# Patient Record
Sex: FEMALE | Race: WHITE | NOT HISPANIC OR LATINO | URBAN - METROPOLITAN AREA
[De-identification: names, ages, dates, MRNs, and addresses within clinical notes are randomized per-mention and may not be internally consistent; named-entity substitution may affect disease eponyms.]

---

## 2017-08-22 ENCOUNTER — IMPORTED ENCOUNTER (OUTPATIENT)
Dept: URBAN - METROPOLITAN AREA CLINIC 59 | Facility: CLINIC | Age: 67
End: 2017-08-22

## 2017-08-22 PROBLEM — H04.121 TEAR FILM INSUFFICIENCY OF RT LACRIMAL GLAND: Noted: 2017-08-22

## 2017-08-22 PROBLEM — H25.13 BILATERAL NUCLEAR SCLEROSIS CATARACTS: Noted: 2017-08-22

## 2017-08-22 PROBLEM — H04.122 TEAR FILM INSUFFICIENCY OF LT LACRIMAL GLAND: Noted: 2017-08-22

## 2017-08-22 PROBLEM — E11.9 TYPE II DM W/O COMPLICATIONS: Noted: 2017-08-22

## 2017-08-22 PROCEDURE — 83861 MICROFLUID ANALY TEARS: CPT

## 2017-08-22 PROCEDURE — 92014 COMPRE OPH EXAM EST PT 1/>: CPT

## 2018-08-21 ENCOUNTER — IMPORTED ENCOUNTER (OUTPATIENT)
Dept: URBAN - METROPOLITAN AREA CLINIC 59 | Facility: CLINIC | Age: 68
End: 2018-08-21

## 2018-08-21 PROBLEM — E11.9 TYPE II DM W/O COMPLICATIONS: Noted: 2018-08-21

## 2018-08-21 PROBLEM — H04.122 TEAR FILM INSUFFICIENCY OF LT LACRIMAL GLAND: Noted: 2018-08-21

## 2018-08-21 PROBLEM — H25.13 BILATERAL NUCLEAR SCLEROSIS CATARACTS: Noted: 2018-08-21

## 2018-08-21 PROBLEM — H04.121 TEAR FILM INSUFFICIENCY OF RT LACRIMAL GLAND: Noted: 2018-08-21

## 2018-08-21 PROCEDURE — 92014 COMPRE OPH EXAM EST PT 1/>: CPT

## 2018-08-21 PROCEDURE — 92015 DETERMINE REFRACTIVE STATE: CPT

## 2019-07-17 ENCOUNTER — TELEPHONE (OUTPATIENT)
Dept: INTEGRATIVE MEDICINE | Age: 69
End: 2019-07-17

## 2019-07-17 RX ORDER — GLUCOSAM/CHONDRO/HERB 149/HYAL 750-100 MG
TABLET ORAL DAILY
COMMUNITY

## 2019-07-17 RX ORDER — ACETAMINOPHEN 500 MG
5000 TABLET ORAL DAILY
COMMUNITY

## 2019-07-17 RX ORDER — ASPIRIN 81 MG/1
81 TABLET ORAL DAILY
COMMUNITY

## 2019-07-25 ENCOUNTER — OFFICE VISIT (OUTPATIENT)
Dept: INTEGRATIVE MEDICINE | Age: 69
End: 2019-07-25

## 2019-07-25 VITALS
HEART RATE: 70 BPM | BODY MASS INDEX: 39.24 KG/M2 | DIASTOLIC BLOOD PRESSURE: 82 MMHG | SYSTOLIC BLOOD PRESSURE: 134 MMHG | RESPIRATION RATE: 20 BRPM | TEMPERATURE: 98.1 F | OXYGEN SATURATION: 97 % | WEIGHT: 250 LBS | HEIGHT: 67 IN

## 2019-07-25 DIAGNOSIS — E06.3 HYPOTHYROIDISM DUE TO HASHIMOTO'S THYROIDITIS: ICD-10-CM

## 2019-07-25 DIAGNOSIS — N95.1 MENOPAUSE SYNDROME: Primary | ICD-10-CM

## 2019-07-25 DIAGNOSIS — G47.00 INSOMNIA DISORDER RELATED TO KNOWN ORGANIC FACTOR: ICD-10-CM

## 2019-07-25 PROBLEM — D35.01 ADRENAL ADENOMA, RIGHT: Status: ACTIVE | Noted: 2019-07-25

## 2019-07-25 PROBLEM — E66.9 OBESITY (BMI 30-39.9): Status: ACTIVE | Noted: 2019-07-25

## 2019-07-25 PROBLEM — L65.9 ALOPECIA: Status: ACTIVE | Noted: 2019-07-25

## 2019-07-25 PROCEDURE — INTG102 PR NEW WELLNESS ASSESSMENT (60 MIN): Performed by: FAMILY MEDICINE

## 2019-07-25 RX ORDER — LIOTHYRONINE SODIUM 25 UG/1
TABLET ORAL
Qty: 90 TABLET | Refills: 3 | Status: SHIPPED | OUTPATIENT
Start: 2019-07-25 | End: 2020-04-27 | Stop reason: SDUPTHER

## 2019-07-25 RX ORDER — LEVOTHYROXINE SODIUM 137 UG/1
TABLET ORAL
Qty: 90 TABLET | Refills: 3 | Status: SHIPPED | OUTPATIENT
Start: 2019-07-25 | End: 2020-07-16

## 2019-07-25 ASSESSMENT — ENCOUNTER SYMPTOMS
HEMATURIA: 0
PALPITATIONS: 0
COLOR CHANGE: 0
DIARRHEA: 0
ARTHRALGIAS: 0
VOMITING: 0
FATIGUE: 0
FEVER: 0
UNEXPECTED WEIGHT CHANGE: 0
CHEST TIGHTNESS: 0
APNEA: 0
SINUS PRESSURE: 0
SHORTNESS OF BREATH: 0
CONFUSION: 0
SLEEP DISTURBANCE: 0
AGITATION: 0
CONSTIPATION: 0
ACTIVITY CHANGE: 0
DIZZINESS: 0
SORE THROAT: 0
JOINT SWELLING: 0
HALLUCINATIONS: 0
FREQUENCY: 1
BLOOD IN STOOL: 0
APPETITE CHANGE: 0
ADENOPATHY: 0

## 2019-07-25 NOTE — PROGRESS NOTES
"[facility] Follow Up Visit    Date: 2019    Name: Miesha Ryan    : 1950    Reason for visit:     Allergies: Sulfa (sulfonamide antibiotics)    Medications:   Current Outpatient Prescriptions   Medication Sig Dispense Refill   • aspirin 81 mg enteric coated tablet Take 81 mg by mouth daily.     • cholecalciferol, vitamin D3, (VITAMIN D3) 2,000 unit capsule Take 2,000 Units by mouth daily.     • CHOLESTEROL ORAL Take by mouth. Cholest by renay.     • multivit,Ca,iron,min/FA/dwn868 (DAILY ENERGY ORAL) Take by mouth. Energy revitalization. 1 scoop daily     • omega 3-dha-epa-fish oil (FISH OIL) 1,000 mg (120 mg-180 mg) capsule Take by mouth 2 (two) times a day.     • prasterone, DHEA, (DHEA ORAL) Take 10 mg by mouth daily.     • THYROID, BULK, MISC 2 grain po q am, 1 grain po q pm     • turmeric (CURCUMIN MISC) 500 mg 2 (two) times a day.       No current facility-administered medications for this visit.        Supplements: [ ]    Family History:   Family History   Problem Relation Age of Onset   • Cancer Mother    • Cancer Father          Social History:   Social History     Social History   • Marital status:      Spouse name: N/A   • Number of children: N/A   • Years of education: N/A     Social History Main Topics   • Smoking status: Former Smoker   • Smokeless tobacco: Never Used   • Alcohol use 4.2 oz/week     7 Standard drinks or equivalent per week   • Drug use: No   • Sexual activity: Not Asked     Other Topics Concern   • None     Social History Narrative   • None        Vitals: /82 (BP Location: Left forearm, Patient Position: Sitting)   Temp 36.7 °C (98.1 °F) (Oral)   Ht 1.702 m (5' 7\")   Wt 113 kg (250 lb)   BMI 39.16 kg/m²     Weight: Weight: 113 kg (250 lb)     Height: Height: 170.2 cm (5' 7\")     BMI:Body mass index is 39.16 kg/m².    BALTA: [ ]    % Body Fat: [ ]    % Muscle Mass: [ ]    Visceral fat:  []    WC: [ ]Working         HPI  " 3 weeks in the [place] 22 family,  2 months all over [place] still with a bunch of people.    Wt up 5 lbs  Jan and Feb was doing her supplements and smoothies, and not much in the evening.  By end of evening, Taxes kicked in , hours longer, stresss went, and started eaing only a little different.  Not as much smoothies.  Slim cuisine microwave.   Overall   Yawning and tied in 3 pm.  Cleaned the whole house after tax season, spring cleaning.  Pressure washed it all.  Week before Mem Day began walking 2 miles in the morning..   Not last 10 days.    Diet been buying cucumbers and squash.  Home made fruit salad.      Not losing.     Problems getting naturthroid.  Not enough for her trip.    2003 began with Dr Garcia started on Synthroid and lipitor.  Then Hilda leonard on synthroid, went natural to another endo doc.  And switched to the naturethroid.   100 lbs heavier.     Bad hot flashes, sweats at night some  Sleep is poor.  Not up  Bladder surgery  Still goes every 90 minutes.  Gets up at night to pee. Ure/ Had the catheter after the sling surgery, kkept going often after the catheter removed.  Dec 10 sling procedru      Goes every day Nl BM, only GERD if eats too late.   1992  42 yr old had LYDIA.  Around 48 hot flashes, and they more or less persisted since then .    Was on the extrogen patch due o hot flashes and not sleeping at night.  Adrenal cyst.  Put on a patch 20 years ago.  Got  in Aug of 2005.  When on the patch felt much better and slept maybe 4 - 6 hours a night.   Hot flashes went away.    Dr Shane says making enough on its own.  Wholistic doc .  Adrenal support and lost weight and lost 30 lbs over 9 - 10 months and slept.   DHEA every other day.    CT right adrenal adenoma says did not change over several years of CT scans.      MRI done for a few years, probably at [place].      Back from the cruise on the 25th of Aug, Dentist on 26th of August, up this way Sept the 3rd.   No acupuncture for 2  years.  Nieves the acupuncturist saw in the past.       Review of Systems   Constitutional: Negative for activity change, appetite change, fatigue, fever and unexpected weight change.   HENT: Negative for sinus pressure and sore throat.    Eyes:        Wears glasses   Respiratory: Negative for apnea, chest tightness and shortness of breath.    Cardiovascular: Negative for chest pain, palpitations and leg swelling.   Gastrointestinal: Negative for blood in stool, constipation, diarrhea and vomiting.   Genitourinary: Positive for frequency and urgency. Negative for hematuria.        Had urethral sling placed in Dec 2018   Musculoskeletal: Negative for arthralgias and joint swelling.   Skin: Negative for color change, pallor and rash.   Neurological: Negative for dizziness and syncope.   Hematological: Negative for adenopathy.   Psychiatric/Behavioral: Negative for agitation, confusion, hallucinations and sleep disturbance.         Physical Exam   Alert, NAD, Nl gait.  Respiratory effort normal, RR about 16/min.  No edema or rash.       Other:    Labs: Glucose was 138, HEMA globin A1c 6.3%, insulin was 24, down from 35.  Rest of CMP normal, DHEA was 108, testosterone was normal, estradiol level was about 12.5.        ASSESSMENT:  Postmenopausal since about age 48, total abdominal hysterectomy for fibroids in 1992.  Vasomotor symptoms are distressing, waking her up at night.  She was briefly on estradiol by patch may be 15 years ago, but only took it for a year or less.    Alopecia, diffuse hair thinning for the last few years.  Stress-induced, excess cortisol, menopausal effect, inadequate thyroid?    Obesity, improved her variety of foods and decreased sugar and simple carbohydrates without weight loss.  Rule out several factors affecting persistent weight gain, including poor sleep, stress, possible micro-biome problems, imbalance of her hormone systems, rule out toxin such as mold toxin issues.    Prediabetes with  insulin resistance    Pre-hypertension, blood pressure good today.    Hashimoto's thyroiditis currently on Nature-Throid, mild elevation of TPO antibodies in the 116 range.  Has a past history of a thyroid nodule.    Negative testing for hepatitis C    Urine incontinence, mixed, only mild improvement after urethral sling.  UTI in November 2018.        Getting annual breast cancer screening through thermography.    Hyperlipidemia on Mariel Choleast.     PLAN:  We discussed the general work-up for difficulty with losing weight, we also discussed several dietary options, including seeing a nutritionist.  Patient will most likely benefit from a ketogenic diet approach, and she will consider this for after her vacation.    Continue coenzyme Q 10 100 mg a day due to the red yeast rice, vitamin D 2000 units a day, fish oil 1000 mg a day, DHEA may be 1 a day now at 10 mg for the next 3 months.  See what happens with weight, hair, hot flashes, and levels.  Continue curcumin twice daily    Begin ONE multivitamin by pure encapsulation's 1 a day and discontinue the energy revitalization system powder for the next 3 months while she is away.    We discussed several methods, medications, and techniques to try and help with her sleep.  She will do a trial of the end fatigue sleep formula from [employer] [employer], number 60 pills given today.  To take 2 at night for the next month.  Discussed use, side effects, and expected action over the next 2 to 4 weeks.    Due to daytime fatigue and stress, may use Rhodiola 1 with breakfast and one with lunch as an adaptogen.    Encouraged her to return to regular walking program which I think was improving her insulin resistance and energy.    We discussed different forms of thyroid, and the fact that she was given Nature-Throid just in an attempt to have more natural therapy but not because she failed other therapy.  Discussed alternatives other medications, as well as using T4 and  T3 separately for more accurate fine-tuning of hormone need.  Patient is in favor of making a switch at this point.  To begin levothyroxine 137 mcg 1 a day, before breakfast.  Prescription called in.  Also to take liothyronine T3, 25 mcg, one half before breakfast, prescription also called in.  Lab slip given to have TSH, free T4, free T3 done in about 5 to 6 weeks when she is in between her [place] cruise and her [place] vacation.  Lipid panel also ordered.    We discussed other foods, fiber, cinnamon that have been associated with better sugar control.  Consider metformin.    She will contact an acupuncturist, Lillian Juarez in [place], about acupuncture treatment for her hot flashes.  We also discussed possibility of low-dose transdermal estrogen, estradiol, because of the severity of her symptoms.    Plan is to return also in 3 months after all her vacation, to assess where her weight, diet, and symptoms are at.  Consider overhaul of supplements, full investigation of her GI tract, and environmental exposures at that time.  Patient also has not had a sleep study done, but denies snoring.    Considered four-point saliva cortisol testing, consider repeat MRI of the adrenals, last done about 5 years ago.    RTO:   [ ] 3 months, call in 5 weeks

## 2019-08-12 ENCOUNTER — TELEPHONE (OUTPATIENT)
Dept: INTEGRATIVE MEDICINE | Age: 69
End: 2019-08-12

## 2019-08-12 NOTE — TELEPHONE ENCOUNTER
Call:  During out office visit We specifically decided to stop the combination thyroid product she had been on, and prescribe the T4 hormone (L-thyroxine) and the T3 hormone (lipothyronine) separately in order to fine tune her levels and ensure accurate dosing.

## 2019-08-12 NOTE — TELEPHONE ENCOUNTER
Pt called, very upset that she rec'd L-Thyroxine from the pharmacy, She says that the L-thyroxine does not work for her. Liothyronine was also sent in.. What to do?

## 2019-08-27 ENCOUNTER — IMPORTED ENCOUNTER (OUTPATIENT)
Dept: URBAN - METROPOLITAN AREA CLINIC 59 | Facility: CLINIC | Age: 69
End: 2019-08-27

## 2019-08-27 PROBLEM — H25.13 BILATERAL NUCLEAR SCLEROSIS CATARACTS: Noted: 2019-08-27

## 2019-08-27 PROBLEM — H04.123 TEAR FILM INSUFFICIENCY OF BILATERAL LACRIMAL GLANDS: Noted: 2019-08-27

## 2019-08-27 PROBLEM — E11.9 TYPE II DM W/O COMPLICATIONS: Noted: 2019-08-27

## 2019-08-27 PROCEDURE — 92014 COMPRE OPH EXAM EST PT 1/>: CPT

## 2019-08-30 LAB
CHOLEST SERPL-MCNC: 176 MG/DL (ref 100–199)
HDLC SERPL-MCNC: 42 MG/DL
LDLC SERPL CALC-MCNC: 95 MG/DL (ref 0–99)
LDLC/HDLC SERPL: 2.3 RATIO (ref 0–3.2)
T3FREE SERPL-MCNC: 3.4 PG/ML (ref 2–4.4)
T4 FREE SERPL-MCNC: 1.44 NG/DL (ref 0.82–1.77)
TRIGL SERPL-MCNC: 195 MG/DL (ref 0–149)
TSH SERPL DL<=0.005 MIU/L-ACNC: 0.02 UIU/ML (ref 0.45–4.5)
VLDLC SERPL CALC-MCNC: 39 MG/DL (ref 5–40)

## 2019-09-03 ENCOUNTER — TELEPHONE (OUTPATIENT)
Dept: INTEGRATIVE MEDICINE | Age: 69
End: 2019-09-03

## 2019-09-03 NOTE — TELEPHONE ENCOUNTER
TC pt says still has fatigue on the current new thyroid dose of 12.5 of T3 in the morning and 137 of llevothyroxine.      TSH is suppressed at 0.016, T4 and Ft3 are improved in middle range.      Suggested that more is in play then just the thyroid about her fatigue.  Going away for two months now, would not change dose.  RTO after 2 mo for overhaul.    Dr. RON

## 2019-11-27 NOTE — PROGRESS NOTES
[facility] Follow Up Visit    Date: 12/3/2019    Name: Miesha yRan    : 1950    Reason for visit:     Allergies: Sulfa (sulfonamide antibiotics)    Medications:   Current Outpatient Medications   Medication Sig Dispense Refill   • aspirin 81 mg enteric coated tablet Take 81 mg by mouth daily.     • cholecalciferol, vitamin D3, (VITAMIN D3) 2,000 unit capsule Take 2,000 Units by mouth daily.     • CHOLESTEROL ORAL Take by mouth. Cholest by renay.     • HERBAL DRUGS ORAL Take by mouth. cinnamon     • levothyroxine (SYNTHROID) 137 mcg tablet One pill each morning before breakfast 90 tablet 3   • liothyronine (CYTOMEL) 25 mcg tablet Take 1/2 pill each morning before breakfast 90 tablet 3   • multivit,Ca,iron,min/FA/egv600 (DAILY ENERGY ORAL) Take by mouth. Energy revitalization. 1 scoop daily     • omega 3-dha-epa-fish oil (FISH OIL) 1,000 mg (120 mg-180 mg) capsule Take by mouth 2 (two) times a day.     • prasterone, DHEA, (DHEA ORAL) Take 10 mg by mouth daily.     • turmeric (CURCUMIN MISC) 500 mg 2 (two) times a day.       No current facility-administered medications for this visit.        Supplements: [ ]    Family History:   Family History   Problem Relation Age of Onset   • Cancer Biological Mother    • Cancer Biological Father          Social History:   Social History     Socioeconomic History   • Marital status:      Spouse name: None   • Number of children: None   • Years of education: None   • Highest education level: None   Occupational History   • None   Social Needs   • Financial resource strain: None   • Food insecurity:     Worry: None     Inability: None   • Transportation needs:     Medical: None     Non-medical: None   Tobacco Use   • Smoking status: Former Smoker   • Smokeless tobacco: Never Used   Substance and Sexual Activity   • Alcohol use: Yes     Alcohol/week: 7.0 standard drinks     Types: 7 Standard drinks or equivalent per week   • Drug  "use: No   • Sexual activity: None   Lifestyle   • Physical activity:     Days per week: None     Minutes per session: None   • Stress: None   Relationships   • Social connections:     Talks on phone: None     Gets together: None     Attends Oriental orthodox service: None     Active member of club or organization: None     Attends meetings of clubs or organizations: None     Relationship status: None   • Intimate partner violence:     Fear of current or ex partner: None     Emotionally abused: None     Physically abused: None     Forced sexual activity: None   Other Topics Concern   • None   Social History Narrative   • None        Vitals:   Visit Vitals  /72 (BP Location: Left upper arm, Patient Position: Sitting)   Pulse 84   Temp 37.1 °C (98.7 °F) (Oral)   Ht 1.702 m (5' 7\")   Wt 116 kg (255 lb 6.4 oz)   SpO2 95%   BMI 40.00 kg/m²       Weight: Weight: 116 kg (255 lb 6.4 oz)     Height: Height: 170.2 cm (5' 7\")     BMI:Body mass index is 40 kg/m².    BALTA: [ ]    % Body Fat: [ ]    % Muscle Mass: [ ]    Visceral fat:  []    WC: [ ]    NSR: 81/min      HPI  [place] RON in [place].   Stockings compression      10 weeks in [place]  Barefoot at home.   Sandals or loafers and  Some burning and tingling in the feet for 40 years  Leather shoes needed.     Toothache like HA last week, lips a little numb, and feels different.  Left side around the outside of the lips. Tingling feeling.  For about the last week.   Dentist tomorrow. No pain .  Clenches jaw some today.  Mouth gets dry at night.  Started the humidifier in the bedroom.  Nose congestion is improved.   Mild ache in the left side around the temple.   No vertigo,   Tinnitus for many years.   No numbness in the arms or legs  Right arm may be a little sore, hurts around the outside of the right elbow.  Ran out of the curcumin and     Right elbow banged on a door frame then worked 19 days in a row.    Hot flashes and sweats at night.   Sleep is still " poor.  Tried the sleep formla in the summer, has to get up to pee every 2 hours.  Sling procedure was done last Dec.  2018 .  Told she needed to go every 2 hours a year later.     Rhodiola only one bottle.  Out since [place].   DHEA ordered also    Energy revitalization powder,   ONE and Vit D on days not using it.     Bowels moving most days.      Not following any particular diet while traveling a lot.     Bread eaten.  No big trip planned.   Dinner at [facility] loves to cook, but not doing it for one person.     Cereal or scrambled eggs from [employer] and frozen dinner.     Legs got tired after walking too much on her trips.   Back pain.      Disc her fatigue and thyroid in Sept 3  By three in the afternoon was getting really tired iN Nov. Then wide awake when got home later.     Not returned to acupuncture yet.    Up to 10 pm at night.  Up around 5 - 530 am.  Sunday morning was asleep at 830, got up at 630am and fell back to sleep.      Mild GLASS with lifting suitcases.    Planning on using the stationary reclining bike.    Work until end of year Monday only for 3 weeks.     Tosses ad turns frequently, cannot lay on back or gets tingling in the feet. ,   Bronchitis easy when gets sick, had pneumonia in the past.   Has taken astragalus for the winter to prevent infection in the past.     Review of Systems   Constitutional: Negative for activity change, appetite change, fatigue, fever and unexpected weight change.   HENT: Positive for dental problem (To dentist for routine in 1 day).    Eyes:        Wears glasses   Respiratory: Negative for chest tightness and shortness of breath.    Cardiovascular: Negative for chest pain, palpitations and leg swelling.   Gastrointestinal: Negative for blood in stool and vomiting.   Endocrine: Positive for heat intolerance.   Genitourinary: Positive for frequency and urgency. Negative for hematuria.        Hot flashes and night sweats, menopause was at age 48, never stopped  having sx.  Did take HRT briefly in the past.    Musculoskeletal: Negative for arthralgias and joint swelling.   Skin: Negative for color change, pallor and rash.   Neurological: Positive for numbness (tingling of the left side of the mouth at times.  ). Negative for dizziness and syncope.   Hematological: Negative for adenopathy.   Psychiatric/Behavioral: Negative for agitation, confusion, decreased concentration, hallucinations and sleep disturbance.           Physical Exam   Constitutional: She is oriented to person, place, and time. She appears well-developed and well-nourished. No distress.   HENT:   Head: Normocephalic.   Mouth/Throat: Oropharynx is clear and moist. No oropharyngeal exudate.   Mild diffuse hair thinning.    Eyes: Conjunctivae and EOM are normal. No scleral icterus.   Neck: Normal range of motion. No thyromegaly present.   Cardiovascular: Normal rate, regular rhythm, normal heart sounds and intact distal pulses.   Pulmonary/Chest: Effort normal and breath sounds normal.   Abdominal: Soft. Bowel sounds are normal.   Musculoskeletal: She exhibits no edema.   Lymphadenopathy:     She has no cervical adenopathy.   Neurological: She is alert and oriented to person, place, and time. No cranial nerve deficit.   Skin: Skin is warm and dry. No rash noted. No pallor.   Psychiatric: She has a normal mood and affect. Her behavior is normal.   Nursing note and vitals reviewed.        Other:    Labs:     MSQ Score: [ ]      ASSESSMENT:  Postmenopausal since about age 48, total abdominal hysterectomy for fibroids in 1992.  Vasomotor symptoms are distressing, waking her up at night.  She was briefly on estradiol by patch may be 15 years ago, but only took it for a year or less.     Alopecia, diffuse hair thinning for the last few years.  Stress-induced, excess cortisol, menopausal effect, inadequate thyroid?     Obesity, improved her variety of foods and decreased sugar and simple carbohydrates without weight  loss.  Rule out several factors affecting persistent weight gain, including poor sleep, stress, possible micro-biome problems, imbalance of her hormone systems, rule out toxin such as mold toxin issues. May require additional workup.      Prediabetes with insulin resistance, Insulin level 34 and a1c 6.2% in July 2019.     Pre-hypertension, blood pressure good today.     Hashimoto's thyroiditis currently on 12.5 ugm T3 and 137 ugm of T4, TSH suppressed but hormone levels are in good range.  mild elevation of TPO antibodies in the 116 range.  Has a past history of a thyroid nodule.     Negative testing for hepatitis C     Urine incontinence, mixed, only mild improvement after urethral sling.  UTI in November 2018.          Getting annual breast cancer screening through thermography.     Hyperlipidemia on Mariel Choleast.  LDL 96 in August.     Insomnia, awakening from hot flashes, awakening from urinary frequency, possible sleep apnea or musculoskeletal symptoms due to obesity.  May have been helped by the herbal sleep formula we gave her in July.    Edema when traveling, most likely due to weight and venous insufficiency.  Good arterial blood flow present.    Right lateral elbow epicondylitis from trauma and overuse    PLAN:  1. [ ] She should try to ice the right elbow for 10 to 15 minutes twice a day for the next 1 to 2 weeks.  Okay to massage the extensor bundle, stretching exercises discussed.  2. If she needs a note about requiring a gym membership to improve her health she will give us a call so we can order her services to improve her diabetes and heart risk.  3. Patient feels would be impossible to get a good sleep study in her, although she seems to be at high risk for sleep apnea and periodic limb movement disorder.  4. Patient is wishing to try something for hot flashes other than prescription medication or horonme replacement.  She may take estravera by [place] which is Siberian rhubarb, to take 1  a day for the next 3 months.  5. Discussed prevention of leg swelling during travel and during times of prolonged sitting or standing.  Patient is ordering knee-high thigh-high stockings 20 to 30mmHg pressure to wear during those times.  Also can consider taking Pycnogenol 50 mg 1 twice a day for 3 monthsl   6. Continue her current thyroid dose, recheck blood tests in 3 months  7. May continue her Mariel Choleast, recheck CMP in 3 months.  Also check ferritin and CBC to see if contributing to periodic limb movements.  8. Check cortisol fasting in 3 months.  Consider MRI of the adrenals since she may have had some adrenal adenoma noted in the past.  9. We spent a lot of time talking about the lifestyle changes she needs to make in order to lose weight which will help all of her problems.  She is looking at a gym membership which would be paid for by her health insurance, as well as using her stationary bicycle at home on a regular basis.  10. We discussed adequate prep work, shopping, and meal planning when she has to cook for herself for the week.  Given the cardio metabolic diet guide to help with some ideas to include protein at most meals, and markedly cut down on carbohydrate intake.  11. Check insulin and hemoglobin A1c in 3 months as well.  Consider metformin  12. For her fatigue, she will restart the Rhodiola rosacea, 1 pill with breakfast and lunch for the next 3 months.  Also restart the DHEA 10 mg only once a day for 3 months.  13. Continue to alternate between the ONE MVI with 2000 u of Vit D extra and the energy revitalization system vitamin powder  14. Patient is considering acupuncture for her muscle aches and for stress relief.      RTO:   [ ] 3 months, BALTA

## 2019-12-03 ENCOUNTER — OFFICE VISIT (OUTPATIENT)
Dept: INTEGRATIVE MEDICINE | Age: 69
End: 2019-12-03

## 2019-12-03 VITALS
HEART RATE: 84 BPM | WEIGHT: 255.4 LBS | HEIGHT: 67 IN | OXYGEN SATURATION: 95 % | TEMPERATURE: 98.7 F | BODY MASS INDEX: 40.09 KG/M2 | SYSTOLIC BLOOD PRESSURE: 132 MMHG | DIASTOLIC BLOOD PRESSURE: 72 MMHG

## 2019-12-03 DIAGNOSIS — N95.1 MENOPAUSE SYNDROME: Primary | ICD-10-CM

## 2019-12-03 DIAGNOSIS — G47.00 INSOMNIA DISORDER RELATED TO KNOWN ORGANIC FACTOR: ICD-10-CM

## 2019-12-03 DIAGNOSIS — E06.3 HYPOTHYROIDISM DUE TO HASHIMOTO'S THYROIDITIS: ICD-10-CM

## 2019-12-03 DIAGNOSIS — L65.9 ALOPECIA: ICD-10-CM

## 2019-12-03 DIAGNOSIS — E66.9 OBESITY (BMI 30-39.9): ICD-10-CM

## 2019-12-03 PROCEDURE — INTG103 PR FOLLOW-UP CONSULT (60 MIN): Performed by: FAMILY MEDICINE

## 2019-12-03 ASSESSMENT — ENCOUNTER SYMPTOMS
UNEXPECTED WEIGHT CHANGE: 0
CHEST TIGHTNESS: 0
DIZZINESS: 0
ARTHRALGIAS: 0
PALPITATIONS: 0
DECREASED CONCENTRATION: 0
FREQUENCY: 1
SHORTNESS OF BREATH: 0
FATIGUE: 0
NUMBNESS: 1
SLEEP DISTURBANCE: 0
HALLUCINATIONS: 0
CONFUSION: 0
HEMATURIA: 0
ACTIVITY CHANGE: 0
VOMITING: 0
JOINT SWELLING: 0
COLOR CHANGE: 0
AGITATION: 0
APPETITE CHANGE: 0
BLOOD IN STOOL: 0
FEVER: 0
ADENOPATHY: 0

## 2020-04-20 NOTE — PROGRESS NOTES
[facility] Follow Up Visit    Date: 2020    Name: Miesha Ryan    : 1950    Reason for visit:     Allergies: Sulfa (sulfonamide antibiotics)    Medications:   Current Outpatient Medications   Medication Sig Dispense Refill   • aspirin 81 mg enteric coated tablet Take 81 mg by mouth daily.     • cholecalciferol, vitamin D3, (VITAMIN D3) 2,000 unit capsule Take 2,000 Units by mouth daily.     • CHOLESTEROL ORAL Take by mouth. Cholest by renay.     • HERBAL DRUGS ORAL Take by mouth. cinnamon     • levothyroxine (SYNTHROID) 137 mcg tablet One pill each morning before breakfast 90 tablet 3   • liothyronine (CYTOMEL) 25 mcg tablet Take 1/2 pill each morning before breakfast 90 tablet 3   • multivit,Ca,iron,min/FA/kyu045 (DAILY ENERGY ORAL) Take by mouth. Energy revitalization. 1 scoop daily     • omega 3-dha-epa-fish oil (FISH OIL) 1,000 mg (120 mg-180 mg) capsule Take by mouth 2 (two) times a day.     • prasterone, DHEA, (DHEA ORAL) Take 10 mg by mouth daily.     • turmeric (CURCUMIN MISC) 500 mg 2 (two) times a day.       No current facility-administered medications for this visit.        Supplements: [ ]    Family History:   Family History   Problem Relation Age of Onset   • Cancer Biological Mother    • Cancer Biological Father          Social History:   Social History     Socioeconomic History   • Marital status:      Spouse name: Not on file   • Number of children: Not on file   • Years of education: Not on file   • Highest education level: Not on file   Occupational History   • Not on file   Social Needs   • Financial resource strain: Not on file   • Food insecurity:     Worry: Not on file     Inability: Not on file   • Transportation needs:     Medical: Not on file     Non-medical: Not on file   Tobacco Use   • Smoking status: Former Smoker   • Smokeless tobacco: Never Used   Substance and Sexual Activity   • Alcohol use: Yes     Alcohol/week: 7.0  standard drinks     Types: 7 Standard drinks or equivalent per week   • Drug use: No   • Sexual activity: Not on file   Lifestyle   • Physical activity:     Days per week: Not on file     Minutes per session: Not on file   • Stress: Not on file   Relationships   • Social connections:     Talks on phone: Not on file     Gets together: Not on file     Attends Synagogue service: Not on file     Active member of club or organization: Not on file     Attends meetings of clubs or organizations: Not on file     Relationship status: Not on file   • Intimate partner violence:     Fear of current or ex partner: Not on file     Emotionally abused: Not on file     Physically abused: Not on file     Forced sexual activity: Not on file   Other Topics Concern   • Not on file   Social History Narrative   • Not on file        Vitals: There were no vitals taken for this visit.    Weight:       Height:       BMI:There is no height or weight on file to calculate BMI.    BALTA: [ ]    % Body Fat: [ ]    % Muscle Mass: [ ]    Visceral fat:  []    WC: [ ]    NSR:       HPI   Telephone FU visit due to the Corona virus.       Last office visit 12/3/19  Weight was 255     [facility]    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  Little at night.  Did not lose an ounce, did not feel any better.  End of Feb until a week ago doing the taxes.  Weight went back up.      For the last month trying to find any produce is a problem.       Not feeling any better.  Last two months let a 615 am, and not back until 7 - 8 pm for doing taxes.   At 3 pm in afternoon felt sleepy.  Hot flashes are bad again, hot and sweating at night.  Sweats    in 2005 was over 100 lbs.  Did not know Hashimoto's is an autoimmune disease.       Dyspnea with exertion.       Sleep only 2 hours at a time, then gets up to pee.   Sling procedure not improved 12/2019 after  done in 2018.  Still has to go every 2 hours.  Not leaking though. Did not get the Estrovera.    DHEA 10 mg every other day  Rhodiola BID ran out of that last month.    Astragalus taken regular 2 a day.    Mariel Choleast      Take one pill to eliminate DM II  Mouth dry. Face tingling is better.   Tinnitus, no vertigo.      No face to face appt.    Leg swelling is not bad.  A little bit.    No covid sx.  No cat or dog.       Taxes still going strong.   Powder for smoothies, ran out for a while  Stopped on the pills for a while.       Son broke leg, went to the acupucturist [facility] in [place], had neuropathy sx.  Told to get the whole body vibrator that you stand on.  Axis platform vibrates. For a week, some change in the foot burning or discomfort.  Every other day.   No exercise for 2 months.       Not hungry  Night sweats.   RANDOLPH  FRANCISCA     Bowels daily, no strain.  No diarrhea.  GERD only occ if eats spaghetti toolate and too much.  Tomatoes.    Coffee not, decaff green tea.   Friday night beers only.         Review of Systems        Physical Exam      Other:    Labs:     MSQ Score: [ ]    MoCA  Score: [ ]    ASSESSMENT:  1. Postmenopausal since about age 48, total abdominal hysterectomy for fibroids in 1992.  Vasomotor symptoms are distressing, waking her up at night.  She was briefly on estradiol by   patch may be 15 years ago, but only took it for a year or less.  2.   Alopecia, diffuse hair thinning for the last few years.  Stress-induced, excess cortisol, menopausal effect, inadequate thyroid?  3.   Obesity, improved her variety of foods and decreased sugar and simple carbohydrates without weight loss.  Rule out several factors affecting persistent weight gain, including poor sleep, stress, possible micro-biome problems, imbalance of her hormone systems, rule out toxin such as mold toxin issues. May require additional workup.     4.  Type II DM increased since last year's labs:  Glu 154, Insulin 37.5, and A1c 6.5%,   Wt up the last 2 months.  Increased urinating and daytime fatigue.  10. Getting annual breast cancer screening through thermography.  11. Hyperlipidemia on Mariel Choleast.  LDL 96 in August. , Improved this year with HDL up to 50 and LDL at 100, Tg down 196 to 154.    12. Insomnia, awakening from hot flashes, awakening from urinary frequency, possible sleep apnea or musculoskeletal symptoms due to obesity.  May have been helped by the herbal sleep formula we gave her in July.  13. Edema when traveling, most likely due to weight and venous insufficiency.  Good arterial blood flow present.  Improved leg swelling this year.   14. Right lateral elbow epicondylitis from trauma and overuse, resolved.   15.  Compound heterozygous for hereditary hemochromatosis with ferritin level of 260.  C282Y/H63D      1. The patient reviewed and signed our practice consents at their first visit, including their pledged to obtain primary care services from a primary care physician and that we will not serve the role, and also will not bill insurance, as this is a cash only practice.  We also discussed the nature of an integrative medicine practice, and that we are offering treatments that are complementary or alternative to traditional medical treatments, particularly when those treatments have not been successful in making the patient healthier.  Most patients come to this practice looking for treatments that are less toxic, and are also looking for approaches that are innovative or different from the traditional medical approach, with the hope of finding greater health through these other approaches.  The patient understands that these treatments are often less studied then medications approved by the FDA, and they still might have side effects, that require monitoring and follow-up visits in order to assess the patient's response to any treatments we recommend.  The patient also pledges to advise to their primary care physician of  any treatment programs we undertake.  Patient is free to call me, or communicate via email through our portal, with any questions about treatments we propose.      PLAN:  1. [ ]increase thyroid T3 to 12.5 mcg bid (1/2 of a 25 ugm pill bid before meals).  Call and report on condition in 1 mo.  Recheck labs in 3 mo. prescription called into [facility] in [place].  2. Disc DM diagnosis, sx, complications, dietary approach, importance of monitoring sugars, and A1c, and weight loss.  Consider nutritional consultation.  Has had IFG/ DM II for years.  Declines metformin or meds since were ineffective before.   Trial of combo:  Berberine 500 mg bid, ACV 1 tbsp bid in 8 oz of liquid, Iflora probiotic by [employer].  Also increase Flaxseed to 1 tbsp bid, disc use and storage.   3. Should get an US of the GB when the Virus lifts.   4. Will try to set up a sleep study for her at home in [place]  5. Encouraged her to get increased activity/exercise (bought home machine)  6. Avoiding excess processed carbs and sugar.    7. 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  Also consider Mold toxicity at work (thinks no water damage at home).  Also sleep apnea due to daytime fatigue and sleepiness  8. Labs ordered for 3 months from now to include TSH, free T4, free T3, MMP 9, iron and TIBC, ferritin, CMP, hemoglobin A1c.  Refer to hematology if iron level increases.  9. Patient to call me in 1 month to report on condition  10. Please increase the ground flaxseed to 1 tablespoon twice a day, this should help with preventing gallbladder disease, help with cholesterol, and may help with hot flashes.  Also continue your DHEA 10 mg a day.  11.  Continue the energy revitalization system vitamin powder, 1 scoop a day.      RTO:   [ ] Appointment in 3 months

## 2020-04-22 LAB
ALBUMIN SERPL-MCNC: 4.2 G/DL (ref 3.8–4.8)
ALBUMIN/GLOB SERPL: 2 {RATIO} (ref 1.2–2.2)
ALP SERPL-CCNC: 123 IU/L (ref 39–117)
ALT SERPL-CCNC: 24 IU/L (ref 0–32)
AST SERPL-CCNC: 17 IU/L (ref 0–40)
BASOPHILS # BLD AUTO: 0.1 X10E3/UL (ref 0–0.2)
BASOPHILS NFR BLD AUTO: 1 %
BILIRUB SERPL-MCNC: 0.5 MG/DL (ref 0–1.2)
BUN SERPL-MCNC: 10 MG/DL (ref 8–27)
BUN/CREAT SERPL: 17 (ref 12–28)
CALCIUM SERPL-MCNC: 9.7 MG/DL (ref 8.7–10.3)
CHLORIDE SERPL-SCNC: 100 MMOL/L (ref 96–106)
CHOLEST SERPL-MCNC: 181 MG/DL (ref 100–199)
CO2 SERPL-SCNC: 23 MMOL/L (ref 20–29)
CORTIS AM PEAK SERPL-MCNC: 18.3 UG/DL (ref 6.2–19.4)
CREAT SERPL-MCNC: 0.59 MG/DL (ref 0.57–1)
CRP SERPL-MCNC: 6 MG/L (ref 0–10)
EOSINOPHIL # BLD AUTO: 0.2 X10E3/UL (ref 0–0.4)
EOSINOPHIL NFR BLD AUTO: 2 %
ERYTHROCYTE [DISTWIDTH] IN BLOOD BY AUTOMATED COUNT: 12.3 % (ref 11.7–15.4)
FERRITIN SERPL-MCNC: 260 NG/ML (ref 15–150)
GLOBULIN SER CALC-MCNC: 2.1 G/DL (ref 1.5–4.5)
GLUCOSE SERPL-MCNC: 154 MG/DL (ref 65–99)
HBA1C MFR BLD: 6.5 % (ref 4.8–5.6)
HCT VFR BLD AUTO: 46.6 % (ref 34–46.6)
HDLC SERPL-MCNC: 50 MG/DL
HGB BLD-MCNC: 15.7 G/DL (ref 11.1–15.9)
IMM GRANULOCYTES # BLD AUTO: 0 X10E3/UL (ref 0–0.1)
IMM GRANULOCYTES NFR BLD AUTO: 0 %
INSULIN SERPL-ACNC: 37.5 UIU/ML (ref 2.6–24.9)
LAB CORP EGFR IF AFRICN AM: 107 ML/MIN/1.73
LAB CORP EGFR IF NONAFRICN AM: 93 ML/MIN/1.73
LDLC SERPL CALC-MCNC: 100 MG/DL (ref 0–99)
LDLC/HDLC SERPL: 2 RATIO (ref 0–3.2)
LYMPHOCYTES # BLD AUTO: 2.3 X10E3/UL (ref 0.7–3.1)
LYMPHOCYTES NFR BLD AUTO: 30 %
MCH RBC QN AUTO: 30 PG (ref 26.6–33)
MCHC RBC AUTO-ENTMCNC: 33.7 G/DL (ref 31.5–35.7)
MCV RBC AUTO: 89 FL (ref 79–97)
MONOCYTES # BLD AUTO: 0.7 X10E3/UL (ref 0.1–0.9)
MONOCYTES NFR BLD AUTO: 9 %
NEUTROPHILS # BLD AUTO: 4.5 X10E3/UL (ref 1.4–7)
NEUTROPHILS NFR BLD AUTO: 58 %
PLATELET # BLD AUTO: 219 X10E3/UL (ref 150–450)
POTASSIUM SERPL-SCNC: 4.6 MMOL/L (ref 3.5–5.2)
PROT SERPL-MCNC: 6.3 G/DL (ref 6–8.5)
RBC # BLD AUTO: 5.24 X10E6/UL (ref 3.77–5.28)
SODIUM SERPL-SCNC: 141 MMOL/L (ref 134–144)
T3FREE SERPL-MCNC: 3.6 PG/ML (ref 2–4.4)
T4 FREE SERPL-MCNC: 1.52 NG/DL (ref 0.82–1.77)
THYROPEROXIDASE AB SERPL-ACNC: 77 IU/ML (ref 0–34)
TRIGL SERPL-MCNC: 154 MG/DL (ref 0–149)
TSH SERPL DL<=0.005 MIU/L-ACNC: 0.01 UIU/ML (ref 0.45–4.5)
VIT B12 SERPL-MCNC: 502 PG/ML (ref 232–1245)
VLDLC SERPL CALC-MCNC: 31 MG/DL (ref 5–40)
WBC # BLD AUTO: 7.8 X10E3/UL (ref 3.4–10.8)

## 2020-04-27 ENCOUNTER — OFFICE VISIT (OUTPATIENT)
Dept: INTEGRATIVE MEDICINE | Age: 70
End: 2020-04-27

## 2020-04-27 VITALS — BODY MASS INDEX: 40 KG/M2 | HEIGHT: 67 IN

## 2020-04-27 DIAGNOSIS — D89.89 CHRONIC FATIGUE AND IMMUNE DYSFUNCTION SYNDROME (CMS/HCC): ICD-10-CM

## 2020-04-27 DIAGNOSIS — K81.9 GALL BLADDER INFLAMMATION: ICD-10-CM

## 2020-04-27 DIAGNOSIS — N95.1 MENOPAUSE SYNDROME: ICD-10-CM

## 2020-04-27 DIAGNOSIS — G93.32 CHRONIC FATIGUE AND IMMUNE DYSFUNCTION SYNDROME (CMS/HCC): ICD-10-CM

## 2020-04-27 DIAGNOSIS — E83.110 HEREDITARY HEMOCHROMATOSIS (CMS/HCC): ICD-10-CM

## 2020-04-27 DIAGNOSIS — G47.00 INSOMNIA DISORDER RELATED TO KNOWN ORGANIC FACTOR: ICD-10-CM

## 2020-04-27 DIAGNOSIS — E06.3 HYPOTHYROIDISM DUE TO HASHIMOTO'S THYROIDITIS: ICD-10-CM

## 2020-04-27 DIAGNOSIS — E66.9 OBESITY (BMI 30-39.9): ICD-10-CM

## 2020-04-27 DIAGNOSIS — D35.01 ADRENAL ADENOMA, RIGHT: Primary | ICD-10-CM

## 2020-04-27 DIAGNOSIS — L65.9 ALOPECIA: ICD-10-CM

## 2020-04-27 DIAGNOSIS — E11.9 TYPE 2 DIABETES MELLITUS WITHOUT COMPLICATION, WITHOUT LONG-TERM CURRENT USE OF INSULIN (CMS/HCC): ICD-10-CM

## 2020-04-27 LAB — HFE GENE MUT ANL BLD/T: NORMAL

## 2020-04-27 PROCEDURE — INTG103 PR FOLLOW-UP CONSULT (60 MIN): Performed by: FAMILY MEDICINE

## 2020-04-27 RX ORDER — LIOTHYRONINE SODIUM 25 UG/1
TABLET ORAL
Qty: 90 TABLET | Refills: 3 | Status: SHIPPED | OUTPATIENT
Start: 2020-04-27 | End: 2020-07-16 | Stop reason: SDUPTHER

## 2020-04-27 NOTE — PATIENT INSTRUCTIONS
1. Increase your liothyronine T3 hormone to one half of a 25 mcg pill before breakfast, and before dinner.  2. We will recheck your thyroid blood tests in 3 months  3. Please take the following to help with treating your diabetes: Berberine 500 mg pills by Mariel, or natural factors, 1 twice a day with meals.  Also, obtain apple cider vinegar by Vinnie, 1 tablespoon in 8 ounces of liquid twice a day with meals.  Also obtain iFlora probiotic by [employer], and take 2 capsules a day as a probiotic.  Take that combination for the next 3 months, then we will recheck your sugar and hemoglobin A1c test.  4. Yakelin will try to arrange for a home sleep study to be done while you are still in [place].  5. Sometime over the next 3 months we need to order an ultrasound of the gallbladder to be done either there or up here depending on the COVID virus situation.  6. I encourage you to do regular exercise almost every day either walking outside or using your new fitness machine.  7. Other things we may have to check out that would affect your ability to lose weight would include environmental toxins that act as disruptors of your hormones like BPA, or looking for mold toxicity at work which could be difficult to find, and we will check by doing a blood test in 3 months to see if your immune system is being affected by things like that.  8. We will send you a lab slip for the blood work to do in 3 months.  9. You are to call me in 1 month to report on your condition with the higher dose of thyroid, the new supplements, and regular exercise.  10. Please increase your ground flaxseed to 1 tablespoon twice a day, this should help with preventing gallbladder disease, help with cholesterol, and may help with hot flashes.  Also continue your DHEA 10 mg a day.  11. Continue the energy revitalization system vitamin powder, 1 scoop a day.  12. Regular appointment will hopefully be in 3 months

## 2020-04-30 LAB — SPECIMEN STATUS: NORMAL

## 2020-05-27 ENCOUNTER — TELEPHONE (OUTPATIENT)
Dept: INTEGRATIVE MEDICINE | Age: 70
End: 2020-05-27

## 2020-07-16 RX ORDER — LEVOTHYROXINE SODIUM 137 UG/1
TABLET ORAL
Qty: 90 TABLET | Refills: 0 | Status: SHIPPED | OUTPATIENT
Start: 2020-07-16 | End: 2021-03-18

## 2020-07-16 RX ORDER — LIOTHYRONINE SODIUM 25 UG/1
TABLET ORAL
Qty: 90 TABLET | Refills: 3 | Status: SHIPPED | OUTPATIENT
Start: 2020-07-16 | End: 2020-10-21 | Stop reason: SDUPTHER

## 2020-07-16 NOTE — TELEPHONE ENCOUNTER
Pt needs liothyronine and levothyroxine ASAP. Says she called on Monday and left message and pharmacy left message as well. Wants to  today.

## 2020-08-03 ENCOUNTER — TELEPHONE (OUTPATIENT)
Dept: INTEGRATIVE MEDICINE | Age: 70
End: 2020-08-03

## 2020-08-03 DIAGNOSIS — Z79.899 ENCOUNTER FOR LONG-TERM (CURRENT) USE OF OTHER MEDICATIONS: ICD-10-CM

## 2020-08-03 DIAGNOSIS — E78.00 PURE HYPERCHOLESTEROLEMIA: ICD-10-CM

## 2020-08-03 DIAGNOSIS — I10 ESSENTIAL HYPERTENSION, BENIGN: ICD-10-CM

## 2020-08-03 DIAGNOSIS — E03.9 HYPOTHYROID OBESITY: ICD-10-CM

## 2020-08-03 DIAGNOSIS — D89.89 AUTOIMMUNE DISORDER (CMS/HCC): ICD-10-CM

## 2020-08-03 DIAGNOSIS — R68.82 DECREASED LIBIDO: ICD-10-CM

## 2020-08-03 DIAGNOSIS — N95.1 MENOPAUSAL STATE: Primary | ICD-10-CM

## 2020-08-03 DIAGNOSIS — Z77.120 EXPOSURE TO MOLD: ICD-10-CM

## 2020-08-03 DIAGNOSIS — E83.118 OTHER HEMOCHROMATOSIS: ICD-10-CM

## 2020-08-03 DIAGNOSIS — E11.8 CONTROLLED DIABETES MELLITUS TYPE 2 WITH COMPLICATIONS, UNSPECIFIED WHETHER LONG TERM INSULIN USE (CMS/HCC): ICD-10-CM

## 2020-08-04 NOTE — TELEPHONE ENCOUNTER
Do labs slip for:    TSH, free T4, free T3, MMP 9, iron and TIBC, ferritin, CMP, hemoglobin A1c., CBC, Lipids    E78.00  E03.9  I10  DM II  Hemochromatosis  D89.89  Z77.120

## 2020-08-25 ENCOUNTER — IMPORTED ENCOUNTER (OUTPATIENT)
Dept: URBAN - METROPOLITAN AREA CLINIC 59 | Facility: CLINIC | Age: 70
End: 2020-08-25

## 2020-08-25 PROBLEM — E11.9 TYPE II DM W/O COMPLICATIONS: Noted: 2020-08-25

## 2020-08-25 PROBLEM — H25.13 BILATERAL NUCLEAR SCLEROSIS CATARACTS: Noted: 2020-08-25

## 2020-08-25 PROBLEM — H04.123 TEAR FILM INSUFFICIENCY OF BILATERAL LACRIMAL GLANDS: Noted: 2020-08-25

## 2020-08-25 PROCEDURE — 92014 COMPRE OPH EXAM EST PT 1/>: CPT

## 2020-09-03 RX ORDER — ACETAMINOPHEN AND PHENYLEPHRINE HCL 325; 5 MG/1; MG/1
TABLET ORAL DAILY
COMMUNITY
End: 2023-01-03

## 2020-09-03 RX ORDER — CRANBERRY FRUIT EXTRACT 650 MG
CAPSULE ORAL DAILY
COMMUNITY
End: 2021-03-18

## 2020-09-03 RX ORDER — TETRACYCLINE HCL 500 MG
CAPSULE ORAL DAILY
COMMUNITY

## 2020-09-03 NOTE — PROGRESS NOTES
[facility] Follow Up Visit    Date: 9/10/2020    Name: Miesha Ryan    : 1950    Reason for visit:     Allergies: Sulfa (sulfonamide antibiotics)    Medications:   Current Outpatient Medications   Medication Sig Dispense Refill   • apple cider vinegar 500 mg tablet Take by mouth daily. Vitamin B12 60 mcg. vitamin b9 60 mcg. mona 50 mg     • aspirin 81 mg enteric coated tablet Take 81 mg by mouth daily.     • ASTRAGALUS ROOT, BULK, MISC 300 mg daily.      • biotin 10,000 mcg capsule Take by mouth daily.     • cholecalciferol, vitamin D3, (VITAMIN D3) 2,000 unit capsule Take 5,000 Units by mouth daily.      • CHOLESTEROL ORAL Take by mouth. Cholest by renay. 900 mg 2 daily      • COQ10, UBIQUINOL, ORAL Take 20 mg by mouth daily.      • HERBAL DRUGS ORAL Take by mouth. Cinnamon. 1800 mg 2 daily      • HERBAL DRUGS ORAL Take by mouth. Berberine. 1200 mg. 2 daily     • Lactobacillus acidophilus (PROBIOTIC ORAL) Take by mouth. IFLORA. 243 mg. 1 daily     • levothyroxine (SYNTHROID) 137 mcg tablet TAKE 1 TABLET BY MOUTH ONCE DAILY IN THE MORNING BEFORE BREAKFAST 90 tablet 0   • liothyronine (CYTOMEL) 25 mcg tablet Take 1/2 pill each morning before breakfast and each afternoon before dinner 90 tablet 3   • mv-mn/iron/folic acid/herb 190 (VITAMIN D3 COMPLETE ORAL) Take by mouth. Vitamin a 3000iu, d3 2000 iu, k2 100pg     • omega 3-dha-epa-fish oil (FISH OIL) 1,000 mg (120 mg-180 mg) capsule Take by mouth daily.      • prasterone, DHEA, (DHEA ORAL) Take by mouth daily.      • prasterone, dhea, 25 mg capsule Take by mouth daily.      • turmeric (CURCUMIN MISC) 500 mg 2 (two) times a day. Curcumin 1800 mg 2 daily. Black pepper 15 mg BID      • multivit,Ca,iron,min/FA/fro288 (DAILY ENERGY ORAL) Take by mouth. NOT TAKING        No current facility-administered medications for this visit.        Supplements: [ ]    Family History:   Family History   Problem Relation Age of Onset  "  • Cancer Biological Mother    • Cancer Biological Father          Social History:   Social History     Socioeconomic History   • Marital status:      Spouse name: None   • Number of children: None   • Years of education: None   • Highest education level: None   Occupational History   • None   Social Needs   • Financial resource strain: None   • Food insecurity:     Worry: None     Inability: None   • Transportation needs:     Medical: None     Non-medical: None   Tobacco Use   • Smoking status: Former Smoker   • Smokeless tobacco: Never Used   Substance and Sexual Activity   • Alcohol use: Yes     Alcohol/week: 7.0 standard drinks     Types: 7 Standard drinks or equivalent per week   • Drug use: No   • Sexual activity: None   Lifestyle   • Physical activity:     Days per week: None     Minutes per session: None   • Stress: None   Relationships   • Social connections:     Talks on phone: None     Gets together: None     Attends Mandaeism service: None     Active member of club or organization: None     Attends meetings of clubs or organizations: None     Relationship status: None   • Intimate partner violence:     Fear of current or ex partner: None     Emotionally abused: None     Physically abused: None     Forced sexual activity: None   Other Topics Concern   • None   Social History Narrative   • None        Vitals:   Visit Vitals  /82 (BP Location: Left upper arm, Patient Position: Sitting)   Pulse 71   Temp 36.1 °C (97 °F) (Temporal)   Resp 17   Ht 1.702 m (5' 7\")   Wt 116 kg (256 lb 6.4 oz)   SpO2 97%   BMI 40.16 kg/m²       Weight: Weight: 116 kg (256 lb 6.4 oz)     Height: Height: 170.2 cm (5' 7\")     BMI:Body mass index is 40.16 kg/m².    BALTA: [ ]    % Body Fat: 51.8    % Muscle Mass: 68.3    Visceral fat:  20    WC: [ ]    NSR:       HPI  For recheck after 5 months.  DM II, obesity, thyroid, alopecia, cholesterol.     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  July 20th " worked, now only 2 days a week, was really busy.  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  Not going to wear a mask at work.    Work with no contact.  Usual schedule until Nov 2 days a week, then increase in the winter.  Will continue her usual schedule.      Seen kids a couple of times.  Did not want her to travel from [place].  One women nearby gone out outside.  Hot in [place].  Worked out in the yard.  Could not walk due to too hot.  Bought the home gym 2x a week.  For one week, two smoothies a day and green tea, and no change in weight.   Cannot eat salad twice a day. . One person cooking.    High sodium meals with prepared companies.     BF scrambled eggs, cottage cheese and fruit, yogurt.  PB toast rye break.  Avoiding the starches some.  Few potatoes, pizza on Friday night at [facility].      Lunch:  Left overs, burger, taco meat. If ate After 3 pm was upset with the ACV, can't eat Mexican, gets Heartburn.    Berberine and iflora and ACV, bowels back to normal  Dr [facility] 1 mo ago due to urine urgency.    Told to eliminate citrus, caffeine and tamiko.  Told to cut back tomatoes.  Wants to put on medication.  Still goes frequently.  After the bladder surgery told to pee every 1 - 2 hours, got used to it and goes still the same, if does not go right away will leak.      Not cooked dinner always.      Sleep up every 2 hours to pee, once slept 5 hours.  Up once or twice.      Aug antibiotic, UTI.  Sx cleared.      1st test did not work well.  Band was poking her in the head.     Hot flashes during day, sweats at night the same.     Some pain or a pressure feeling like cramps in the uterus, lower  Squeezing feeling.          Review of Systems  No CP, palpitations, SOB, dizzyness, syncope.  No COVID sx.       Physical Exam  Alert, NAD, no tremor.  Redness mild of the cheeks and between the eyes.  Small 2 - 3 mm seb ker's of the scalp, scalp otherwise Nl but diffuse thinning present as before  HR 78  and regular with ocassional premature beAT, 2/6 MARIUSZ LSB  No edema.    Thyroid NL, no nodes.      Other:    Labs:     MSQ Score: [ ]    ASSESSMENT:  1. Postmenopausal since about age 48, total abdominal hysterectomy for fibroids in 1992.  Vasomotor symptoms are distressing, waking her up at night.  She was briefly on estradiol by   patch may be 15 years ago, but only took it for a year or less.  2.   Alopecia, diffuse hair thinning for the last few years.  Stress-induced, excess cortisol, menopausal effect, inadequate thyroid?  3.   Obesity, improved her variety of foods and decreased sugar and simple carbohydrates without weight loss.  Rule out several factors affecting persistent weight gain, including poor sleep, stress, possible micro-biome problems, imbalance of her hormone systems, rule out toxin such as mold toxin issues. May require additional workup.     4.  Type II DM increased since last year's labs:  Glu 154, Insulin 37.5, and A1c 6.5%,  Wt up the last 2 months.  Increased urinating and daytime fatigue.  10. Getting annual breast cancer screening through thermography.  11. [facility]lipidemia [facility].  LDL 96 in August. , Improved this year with HDL up to 50 and LDL at 100, Tg down 196 to 154.    12. Insomnia, awakening from hot flashes, awakening from urinary frequency, possible sleep apnea or musculoskeletal symptoms due to obesity.  May have been helped by the herbal sleep formula we gave her in July.  13. Edema when traveling, most likely due to weight and venous insufficiency.  Good arterial blood flow present.  Improved leg swelling this year.   14. Right lateral elbow epicondylitis from trauma and overuse, resolved.   15.  Compound heterozygous for hereditary hemochromatosis with ferritin level of 260.  C282Y/H63D, Ferritin stable at 267 with normal iron saturation.  Hgb at high end of normal at 15.8, sleep apnea?  16.  Sleep Apnea, Moderate with Hypoxia  17.  Probable rosacea  18.  Irritated  hair follicles and seborheic keratoses of the scalp, alopecia, androgenic?  19.  Elevated alkaline phosphatase at 130 (up from 123), due to DM, obesity, or GB/bile duct disease?  20.  Urge incontinence s/p sling  21.  Asymptomatic PAC's likely.            1. The patient reviewed and signed our practice consents at their first visit, including their pledge to obtain primary care services from a primary care physician and that we will not serve the role, and also will not bill insurance, as this is a cash only practice.  We also discussed the nature of an integrative medicine practice, and that we are offering treatments that are complementary or alternative to traditional medical treatments, particularly when those treatments have not been successful in making the patient healthier.  Most patients come to this practice looking for treatments that are less toxic, and are also looking for approaches that are innovative or different from the traditional medical approach, with the hope of finding greater health through these other approaches.  The patient understands that these treatments are often less studied then medications approved by the FDA, and they still might have side effects, that require monitoring and follow-up visits in order to assess the patient's response to any treatments we recommend.  The patient also pledges to advise their primary care physician of any treatment programs we undertake.  Patient is free to call me, or communicate via email through our portal, with any questions about treatments we propose.      PLAN:  1. [ ]DHEA every day for next 3 months, check levels 3 mo  2. Can do a trial of Meratrim by [employer] or Oxitrim by ASR, one twice a day  3. To Nutritionist for new mediteranean style lower carb approach with calorie restriction.  Go with Keto diet if no success.   4. Encouraged increased fish intake, less tuna.  Use cheese only sparingly.  Increase exercise efforts to 4 times a week  minimum  5. Check MMP9 result  6. Check US of the abdomen, liver for the increased Alk phos  7. Rev sleep study and health implications including the heart and weight.  Declines a trial of CPAP or sleep doc.  Will see dentist next week about a possible jaw advancement device, even interested in surgery for jaw  (wt loss surgery?).  Does not think she can tolerate anything on her face, even O2 canula.  Wt loss needed to lessen impact of sleep apnea  8. Cont current supplements, prefer ACV liquid 1 Tbsp Bid instead of gummie.  Cont Berberine 500 bid  9. Cont choleast since LDL 95, Tg only 151 and HDL 45, disc weight, diet, DM effects on lipids.  On one fish oil 1000 mg a day currently.   10. Doses of thyroid at maximum, will observe at this level for next 3 months.   11. Does not think she would do frequent blood draws for iron.  Son with similar gene HH picture and iron now.         RTO:   [ ] labs 3 mo and then appt

## 2020-09-05 LAB
ALBUMIN SERPL-MCNC: 4.2 G/DL (ref 3.8–4.8)
ALBUMIN/GLOB SERPL: 1.8 {RATIO} (ref 1.2–2.2)
ALP SERPL-CCNC: 130 IU/L (ref 39–117)
ALT SERPL-CCNC: 29 IU/L (ref 0–32)
AST SERPL-CCNC: 25 IU/L (ref 0–40)
BILIRUB SERPL-MCNC: 0.6 MG/DL (ref 0–1.2)
BUN SERPL-MCNC: 11 MG/DL (ref 8–27)
BUN/CREAT SERPL: 16 (ref 12–28)
CALCIUM SERPL-MCNC: 9.3 MG/DL (ref 8.7–10.3)
CHLORIDE SERPL-SCNC: 101 MMOL/L (ref 96–106)
CHOLEST SERPL-MCNC: 166 MG/DL (ref 100–199)
CO2 SERPL-SCNC: 25 MMOL/L (ref 20–29)
CREAT SERPL-MCNC: 0.68 MG/DL (ref 0.57–1)
DHEA-S SERPL-MCNC: 46.7 UG/DL (ref 20.4–186.6)
ERYTHROCYTE [DISTWIDTH] IN BLOOD BY AUTOMATED COUNT: 13.5 % (ref 11.7–15.4)
ESTRADIOL SERPL-MCNC: 10.9 PG/ML
FERRITIN SERPL-MCNC: 267 NG/ML (ref 15–150)
GLOBULIN SER CALC-MCNC: 2.3 G/DL (ref 1.5–4.5)
GLUCOSE SERPL-MCNC: 155 MG/DL (ref 65–99)
HBA1C MFR BLD: 6.5 % (ref 4.8–5.6)
HCT VFR BLD AUTO: 45.7 % (ref 34–46.6)
HDLC SERPL-MCNC: 45 MG/DL
HGB BLD-MCNC: 15.8 G/DL (ref 11.1–15.9)
IRON SATN MFR SERPL: 41 % (ref 15–55)
IRON SERPL-MCNC: 133 UG/DL (ref 27–139)
LAB CORP EGFR IF AFRICN AM: 102 ML/MIN/1.73
LAB CORP EGFR IF NONAFRICN AM: 89 ML/MIN/1.73
LDLC SERPL CALC-MCNC: 95 MG/DL (ref 0–99)
MCH RBC QN AUTO: 31.2 PG (ref 26.6–33)
MCHC RBC AUTO-ENTMCNC: 34.6 G/DL (ref 31.5–35.7)
MCV RBC AUTO: 90 FL (ref 79–97)
PLATELET # BLD AUTO: 181 X10E3/UL (ref 150–450)
POTASSIUM SERPL-SCNC: 4.7 MMOL/L (ref 3.5–5.2)
PROT SERPL-MCNC: 6.5 G/DL (ref 6–8.5)
RBC # BLD AUTO: 5.06 X10E6/UL (ref 3.77–5.28)
SODIUM SERPL-SCNC: 141 MMOL/L (ref 134–144)
T3FREE SERPL-MCNC: 4.8 PG/ML (ref 2–4.4)
T4 FREE SERPL-MCNC: 1.55 NG/DL (ref 0.82–1.77)
TESTOST SERPL-MCNC: 21 NG/DL (ref 3–41)
TIBC SERPL-MCNC: 323 UG/DL (ref 250–450)
TRIGL SERPL-MCNC: 151 MG/DL (ref 0–149)
TSH SERPL DL<=0.005 MIU/L-ACNC: 0.01 UIU/ML (ref 0.45–4.5)
UIBC SERPL-MCNC: 190 UG/DL (ref 118–369)
VLDLC SERPL CALC-MCNC: 26 MG/DL (ref 5–40)
WBC # BLD AUTO: 7 X10E3/UL (ref 3.4–10.8)

## 2020-09-09 ENCOUNTER — TELEPHONE (OUTPATIENT)
Dept: INTEGRATIVE MEDICINE | Age: 70
End: 2020-09-09

## 2020-09-10 ENCOUNTER — OFFICE VISIT (OUTPATIENT)
Dept: INTEGRATIVE MEDICINE | Age: 70
End: 2020-09-10

## 2020-09-10 VITALS
RESPIRATION RATE: 17 BRPM | DIASTOLIC BLOOD PRESSURE: 82 MMHG | HEIGHT: 67 IN | HEART RATE: 71 BPM | TEMPERATURE: 97 F | WEIGHT: 256.4 LBS | SYSTOLIC BLOOD PRESSURE: 122 MMHG | OXYGEN SATURATION: 97 % | BODY MASS INDEX: 40.24 KG/M2

## 2020-09-10 DIAGNOSIS — E06.3 HYPOTHYROIDISM DUE TO HASHIMOTO'S THYROIDITIS: ICD-10-CM

## 2020-09-10 DIAGNOSIS — T45.4X4D: ICD-10-CM

## 2020-09-10 DIAGNOSIS — R74.8 ABNORMAL LIVER ENZYMES: ICD-10-CM

## 2020-09-10 DIAGNOSIS — G47.00 INSOMNIA DISORDER RELATED TO KNOWN ORGANIC FACTOR: Primary | ICD-10-CM

## 2020-09-10 DIAGNOSIS — N95.1 MENOPAUSE SYNDROME: ICD-10-CM

## 2020-09-10 DIAGNOSIS — E66.9 OBESITY (BMI 30-39.9): ICD-10-CM

## 2020-09-10 DIAGNOSIS — L65.9 ALOPECIA: ICD-10-CM

## 2020-09-10 PROCEDURE — INTG103 PR FOLLOW-UP CONSULT (60 MIN): Performed by: FAMILY MEDICINE

## 2020-09-10 NOTE — PATIENT INSTRUCTIONS
1. See Nutritionist  2. Do the DHEA every day  3. Continue the thyroid doses the same  4. Ultrasound of the Abdomen  5. Sleep Apnea:  Dentist jaw advancement devices  6. Recheck your iron in 3 months  7.

## 2020-09-14 LAB — MMP9 SER-MCNC: 847 NG/ML

## 2020-09-15 ENCOUNTER — TELEPHONE (OUTPATIENT)
Dept: INTEGRATIVE MEDICINE | Age: 70
End: 2020-09-15

## 2020-09-15 NOTE — PROGRESS NOTES
Sent as EMail    This test about the immune system shows that you are reacting to either environmental toxins including water damaged building toxins like mold, or a chronic infection.  This may improve with a change in diet, weight loss, and treatment of sleep apnea.  Would suggest repeating this in three months and then doing more investigation of your home/environment if it remains elevated after you make other diet changes.

## 2020-09-15 NOTE — TELEPHONE ENCOUNTER
Miesha called, she will see Brianne Huerta/ Nutritionist, but was told that in order for MCR to cover, it must say something about Diabetes.Can you write something up for me to send to her?

## 2020-09-15 NOTE — TELEPHONE ENCOUNTER
Write a note on our stationery To Whom It May Concern that she has Type II DM uncontrolled, with Obesity and would benefit greatly from nutritional consultation for control of her DM and for weight loss

## 2020-10-01 ENCOUNTER — TELEPHONE (OUTPATIENT)
Dept: INTEGRATIVE MEDICINE | Age: 70
End: 2020-10-01

## 2020-10-01 NOTE — TELEPHONE ENCOUNTER
Pt requested that US order be sent to [place] for scheduling. Sent Electronically and then faxed. Conf rec'd

## 2020-10-16 ENCOUNTER — TELEPHONE (OUTPATIENT)
Dept: INTEGRATIVE MEDICINE | Age: 70
End: 2020-10-16

## 2020-10-21 RX ORDER — LIOTHYRONINE SODIUM 25 UG/1
TABLET ORAL
Qty: 45 TABLET | Refills: 0 | OUTPATIENT
Start: 2020-10-21

## 2020-10-21 RX ORDER — LIOTHYRONINE SODIUM 25 UG/1
TABLET ORAL
Qty: 45 TABLET | Refills: 3 | Status: SHIPPED | OUTPATIENT
Start: 2020-10-21 | End: 2021-10-05 | Stop reason: SDUPTHER

## 2020-10-21 RX ORDER — LEVOTHYROXINE SODIUM 137 UG/1
137 TABLET ORAL
Qty: 90 TABLET | Refills: 3 | Status: SHIPPED | OUTPATIENT
Start: 2020-10-21 | End: 2021-10-05 | Stop reason: DRUGHIGH

## 2020-10-21 RX ORDER — LEVOTHYROXINE SODIUM 137 UG/1
TABLET ORAL
Qty: 90 TABLET | Refills: 0 | OUTPATIENT
Start: 2020-10-21

## 2020-12-15 ENCOUNTER — TELEPHONE (OUTPATIENT)
Dept: INTEGRATIVE MEDICINE | Age: 70
End: 2020-12-15

## 2020-12-15 LAB
DHEA-S SERPL-MCNC: 287 UG/DL (ref 20.4–186.6)
ERYTHROCYTE [DISTWIDTH] IN BLOOD BY AUTOMATED COUNT: 12.6 % (ref 11.7–15.4)
ESTRADIOL SERPL-MCNC: 6.4 PG/ML
FERRITIN SERPL-MCNC: 176 NG/ML (ref 15–150)
GGT SERPL-CCNC: 26 IU/L (ref 0–60)
HCT VFR BLD AUTO: 45.8 % (ref 34–46.6)
HGB BLD-MCNC: 15.8 G/DL (ref 11.1–15.9)
MCH RBC QN AUTO: 30.7 PG (ref 26.6–33)
MCHC RBC AUTO-ENTMCNC: 34.5 G/DL (ref 31.5–35.7)
MCV RBC AUTO: 89 FL (ref 79–97)
PLATELET # BLD AUTO: 224 X10E3/UL (ref 150–450)
RBC # BLD AUTO: 5.15 X10E6/UL (ref 3.77–5.28)
TESTOST SERPL-MCNC: 76 NG/DL (ref 3–41)
WBC # BLD AUTO: 9 X10E3/UL (ref 3.4–10.8)

## 2020-12-15 NOTE — TELEPHONE ENCOUNTER
Miesha called back, she is taking 25 mg, I instructed her per your instructions to decrease to 10 mg a day. She said that she just bought 2 bottles of the 25 mg capsules, she will take q other day or q 3 days. F/U appointment next Tuesday.

## 2020-12-15 NOTE — TELEPHONE ENCOUNTER
----- Message from Luc Heath MD sent at 12/15/2020  1:06 PM EST -----  Call and tell her to decrease the DHEA to 10 mg a day if on 25 and stop if on 10 mg.  Repeat DHEA, testosterone, Estradiol, in 3 months.  Also TSH and Free T4 and Free T3.  Advise her of half-way.  May wish an appointment in Feb or early March.

## 2020-12-19 LAB
ALP BONE CFR SERPL: 43 % (ref 14–68)
ALP INTEST CFR SERPL: 3 % (ref 0–18)
ALP LIVER CFR SERPL: 54 % (ref 18–85)
ALP SERPL-CCNC: 123 IU/L (ref 39–117)

## 2020-12-21 ENCOUNTER — TELEPHONE (OUTPATIENT)
Dept: INTEGRATIVE MEDICINE | Age: 70
End: 2020-12-21

## 2021-03-11 NOTE — PROGRESS NOTES
[facility] Follow Up Visit    Date: 3/18/2021    Name: Miesha Ryan    : 1950    Reason for visit:     Allergies: Sulfa (sulfonamide antibiotics)    Medications:   Current Outpatient Medications   Medication Sig Dispense Refill   • apple cider vinegar 500 mg tablet Take by mouth daily. Vitamin B12 60 mcg. vitamin b9 60 mcg. mona 50 mg     • aspirin 81 mg enteric coated tablet Take 81 mg by mouth daily.     • ASTRAGALUS ROOT, BULK, MISC 300 mg daily.      • biotin 10,000 mcg capsule Take by mouth daily.     • cholecalciferol, vitamin D3, (VITAMIN D3) 2,000 unit capsule Take 5,000 Units by mouth daily.      • CHOLESTEROL ORAL Take by mouth. Cholest by renay. 900 mg 2 daily      • COQ10, UBIQUINOL, ORAL Take 20 mg by mouth daily.      • HERBAL DRUGS ORAL Take by mouth. Cinnamon. 1800 mg 2 daily      • HERBAL DRUGS ORAL Take by mouth. Berberine. 1200 mg. 2 daily     • Lactobacillus acidophilus (PROBIOTIC ORAL) Take by mouth. IFLORA. 243 mg. 1 daily     • levothyroxine (EUTHYROX) 137 mcg tablet Take 1 tablet (137 mcg total) by mouth daily. 90 tablet 3   • liothyronine (CYTOMEL) 25 mcg tablet Take 1/2 pill po qd 45 tablet 3   • mv-min/vit C/elderber/herb 124 (AIRBORNE ELDERBERRY ORAL) Take by mouth.     • omega 3-dha-epa-fish oil (FISH OIL) 1,000 mg (120 mg-180 mg) capsule Take by mouth daily.      • prasterone, DHEA, (DHEA ORAL) Take by mouth daily.      • turmeric (CURCUMIN MISC) 500 mg 2 (two) times a day. Curcumin 1800 mg 2 daily. Black pepper 15 mg BID      • levothyroxine (SYNTHROID) 137 mcg tablet TAKE 1 TABLET BY MOUTH ONCE DAILY IN THE MORNING BEFORE BREAKFAST 90 tablet 0   • multivit,Ca,iron,min/FA/qhk391 (DAILY ENERGY ORAL) Take by mouth. NOT TAKING      • mv-mn/iron/folic acid/herb 190 (VITAMIN D3 COMPLETE ORAL) Take by mouth. Vitamin a 3000iu, d3 2000 iu, k2 100pg     • prasterone, dhea, 25 mg capsule Take by mouth daily.        No current  "facility-administered medications for this visit.        Supplements: [ ]    Family History:   Family History   Problem Relation Age of Onset   • Cancer Biological Mother    • Cancer Biological Father          Social History:   Social History     Socioeconomic History   • Marital status:      Spouse name: None   • Number of children: None   • Years of education: None   • Highest education level: None   Occupational History   • None   Social Needs   • Financial resource strain: None   • Food insecurity     Worry: None     Inability: None   • Transportation needs     Medical: None     Non-medical: None   Tobacco Use   • Smoking status: Former Smoker   • Smokeless tobacco: Never Used   Substance and Sexual Activity   • Alcohol use: Yes     Alcohol/week: 7.0 standard drinks     Types: 7 Standard drinks or equivalent per week   • Drug use: No   • Sexual activity: None   Lifestyle   • Physical activity     Days per week: None     Minutes per session: None   • Stress: None   Relationships   • Social connections     Talks on phone: None     Gets together: None     Attends Jainism service: None     Active member of club or organization: None     Attends meetings of clubs or organizations: None     Relationship status: None   • Intimate partner violence     Fear of current or ex partner: None     Emotionally abused: None     Physically abused: None     Forced sexual activity: None   Other Topics Concern   • None   Social History Narrative   • None        Vitals:   Visit Vitals  /82 (BP Location: Left upper arm, Patient Position: Sitting)   Pulse 100   Temp 36.4 °C (97.6 °F) (Temporal)   Resp 20   Ht 1.702 m (5' 7\")   Wt 114 kg (252 lb)   SpO2 97%   BMI 39.47 kg/m²       Weight: Weight: 114 kg (252 lb)     Height: Height: 170.2 cm (5' 7\")     BMI:Body mass index is 39.47 kg/m².    BALTA: [       HPI  For Return visit after 12/2020 visit.  [place]heeck on weight, DM, BP, Chol, Thyroid    Previously on [employer] ALMAGUER diet " plan, home meals.  lOST weight initially 14 lbs and not lost further since Christmas.    Wt down 6 lbs and improvement in some metabolic labs.     Vaccine does not trust it.    Carlene got flu shots and not sick.      At work leaves in the dark and home late so not eating well with Tax responsibility.  BF oatmeal, eggs, meal at work. Another 4 weeks, tax refunds claimed for, stressing. Sits in the chair abd sleeps, up at 4 AM  Weekends also working.     GF went out to [employer] and shared wine and steak for [place] Day.   Plan was to restart the diet after she is home and on regular schedule.  And if losing would continue through the summer.  Neighbor to do yard work.  She is 77.  July 12 for 17 days.     Review of Systems  No CP, SOB  630 am breakfast.     Physical Exam  Alert, NAD.  No edema.      Labs:  A1c is 6.3 down from 6.5    MSQ Score: [ ]      ASSESSMENT:  1. Postmenopausal since about age 48, total abdominal hysterectomy for fibroids in 1992.  Vasomotor symptoms are distressing, waking her up at night.  She was briefly on estradiol by   patch may be 15 years ago, but only took it for a year or less.  2.   Alopecia, diffuse hair thinning for the last few years.  Stress-induced, excess cortisol, menopausal effect, inadequate thyroid?  3.   Obesity, improved her variety of foods and decreased sugar and simple carbohydrates without weight loss.  Rule out several factors affecting persistent weight gain, including poor sleep, stress, possible micro-biome problems, imbalance of her hormone systems, rule out toxin such as mold toxin issues. May require additional workup.     4.  Type II DM increased since last year's labs:  Glu 154, Insulin 37.5, and A1c 6.5%,  Wt up down the last 2 months.    10. Getting annual breast cancer screening through thermography.  11. Hyperlipidemia on Mariel Choleast.  LDL 96 in August. , Improved this year with HDL up to 50 and LDL at 100, Tg down 196 to 154.    12. Insomnia,  awakening from hot flashes has decreased, awakening from urinary frequency, possible sleep apnea or musculoskeletal symptoms due to obesity.  May have been helped by the herbal sleep formula we gave her in July.  13. Edema when traveling, most likely due to weight and venous insufficiency.  Good arterial blood flow present.  Improved leg swelling this year.   14. Right lateral elbow epicondylitis from trauma and overuse, resolved.   15.  Compound heterozygous for hereditary hemochromatosis with ferritin level of 260.  C282Y/H63D, Ferritin dropped from 267 to 187 now with normal iron saturation.  Hgb at high end of normal at 15.8, sleep apnea?  16.  Sleep Apnea, Moderate with Hypoxia, declines CPAP  17.  Probable rosacea, on antibiotic  18.  Irritated hair follicles and seborheic keratoses of the scalp, alopecia, androgenic?  Better on Doxy but recurs when stops.  Anti Inflammatory effect of Doxy also?  19.  Elevated alkaline phosphatase at now down to 123 with normal US, and normal fractionation of Alk Phos.  obesity, or DM mainly?  20.  Urge incontinence s/p sling  21.  Asymptomatic PAC's likely.    22.  Leg numbness, postural, spinal stenosis, muscle tension, postural, obesity     1. The patient reviewed and signed our practice consents at their first visit, including their pledge to obtain primary care services from a primary care physician and that we will not serve the role, and also will not bill insurance, as this is a cash only practice.  We also discussed the nature of an integrative medicine practice, and that we are offering treatments that are complementary or alternative to traditional medical treatments, particularly when those treatments have not been successful in making the patient healthier.  Most patients come to this practice looking for treatments that are less toxic, and are also looking for approaches that are innovative or different from the traditional medical approach, with the hope of  finding greater health through these other approaches.  The patient understands that these treatments are often less studied then medications approved by the FDA, and they still might have side effects, that require monitoring and follow-up visits in order to assess the patient's response to any treatments we recommend.  The patient also pledges to advise their primary care physician of any treatment programs we undertake.  Patient is free to call me, or communicate via email through our portal, with any questions about treatments we propose.      PLAN: Main obstacle now is her work schedule and stress effects.         Folliculitis: Stopped the doxycycline, broken out again.  3 days later faCE BROKE OUT.  Cannot do all the cleaning personal care the Derm recommended.  Not seen her again yet due to work.   1. [ ] To see Dr Jensen in June, will give lab slip to do before the visit  2. Will try the shampoo for the folliculitis (either Nizoral or Selsun 2.5%).   3. Continue the thyroid the same T4 and T3 doses the same  4. Cont supplements as is.   5. Recheck the Alk Phos in 3 months also   6.  Will try to maintain at least her current weight through the next 7 weeks of stressful schedule by doing her oatmeal and eggs, and delia ALMAGUER lunch, and better coices at dinner, will use a paleo shake sometimes instead of snack foods.   After May 15, will revert to IRMA Brewer MD meals, and begin more chores and regular exercise to get ready for her trip to [place] in July.        ALEXANDRA

## 2021-03-18 ENCOUNTER — OFFICE VISIT (OUTPATIENT)
Dept: INTEGRATIVE MEDICINE | Age: 71
End: 2021-03-18

## 2021-03-18 VITALS
DIASTOLIC BLOOD PRESSURE: 82 MMHG | TEMPERATURE: 97.6 F | OXYGEN SATURATION: 97 % | HEART RATE: 100 BPM | BODY MASS INDEX: 39.55 KG/M2 | SYSTOLIC BLOOD PRESSURE: 126 MMHG | RESPIRATION RATE: 20 BRPM | WEIGHT: 252 LBS | HEIGHT: 67 IN

## 2021-03-18 DIAGNOSIS — E06.3 HYPOTHYROIDISM DUE TO HASHIMOTO'S THYROIDITIS: ICD-10-CM

## 2021-03-18 DIAGNOSIS — E61.1 IRON DEFICIENCY: ICD-10-CM

## 2021-03-18 DIAGNOSIS — E78.00 HYPERCHOLESTEREMIA: ICD-10-CM

## 2021-03-18 DIAGNOSIS — N95.1 MENOPAUSE SYNDROME: Primary | ICD-10-CM

## 2021-03-18 DIAGNOSIS — E11.9 TYPE 2 DIABETES MELLITUS WITHOUT COMPLICATION, WITHOUT LONG-TERM CURRENT USE OF INSULIN (CMS/HCC): ICD-10-CM

## 2021-03-18 DIAGNOSIS — E66.9 OBESITY (BMI 30-39.9): ICD-10-CM

## 2021-03-18 PROCEDURE — INTG103 PR FOLLOW-UP CONSULT (60 MIN): Performed by: FAMILY MEDICINE

## 2021-04-28 ENCOUNTER — TELEPHONE (OUTPATIENT)
Dept: INTEGRATIVE MEDICINE | Age: 71
End: 2021-04-28

## 2021-04-28 DIAGNOSIS — E61.1 IRON DEFICIENCY: ICD-10-CM

## 2021-04-28 DIAGNOSIS — E11.9 TYPE 2 DIABETES MELLITUS WITHOUT COMPLICATION, WITHOUT LONG-TERM CURRENT USE OF INSULIN (CMS/HCC): Primary | ICD-10-CM

## 2021-04-28 DIAGNOSIS — E78.00 HYPERCHOLESTEREMIA: ICD-10-CM

## 2021-04-28 NOTE — TELEPHONE ENCOUNTER
Kenroy Farris,  I reordered the labs under my name. Please send lab script to patient. Script has been printed.   Thank you!

## 2021-04-28 NOTE — TELEPHONE ENCOUNTER
Miesha called stating that  placed an order for lab work to be completed before her appt with Dr. Jensen. However she does not have an script to get them done. She would like the script sent to her.

## 2021-06-29 LAB
ALBUMIN SERPL-MCNC: 4.1 G/DL (ref 3.7–4.7)
ALBUMIN/GLOB SERPL: 1.8 {RATIO} (ref 1.2–2.2)
ALP SERPL-CCNC: 171 IU/L (ref 48–121)
ALT SERPL-CCNC: 78 IU/L (ref 0–32)
AST SERPL-CCNC: 50 IU/L (ref 0–40)
BILIRUB SERPL-MCNC: 0.6 MG/DL (ref 0–1.2)
BUN SERPL-MCNC: 10 MG/DL (ref 8–27)
BUN/CREAT SERPL: 15 (ref 12–28)
CALCIUM SERPL-MCNC: 9.4 MG/DL (ref 8.7–10.3)
CHLORIDE SERPL-SCNC: 103 MMOL/L (ref 96–106)
CHOLEST SERPL-MCNC: 179 MG/DL (ref 100–199)
CO2 SERPL-SCNC: 24 MMOL/L (ref 20–29)
CREAT SERPL-MCNC: 0.66 MG/DL (ref 0.57–1)
ERYTHROCYTE [DISTWIDTH] IN BLOOD BY AUTOMATED COUNT: 12.6 % (ref 11.7–15.4)
FERRITIN SERPL-MCNC: 207 NG/ML (ref 15–150)
GLOBULIN SER CALC-MCNC: 2.3 G/DL (ref 1.5–4.5)
GLUCOSE SERPL-MCNC: 156 MG/DL (ref 65–99)
HBA1C MFR BLD: 6.3 % (ref 4.8–5.6)
HCT VFR BLD AUTO: 43.9 % (ref 34–46.6)
HDLC SERPL-MCNC: 48 MG/DL
HGB BLD-MCNC: 15.1 G/DL (ref 11.1–15.9)
LAB CORP EGFR IF AFRICN AM: 103 ML/MIN/1.73
LAB CORP EGFR IF NONAFRICN AM: 89 ML/MIN/1.73
LDLC SERPL CALC-MCNC: 111 MG/DL (ref 0–99)
MCH RBC QN AUTO: 30.8 PG (ref 26.6–33)
MCHC RBC AUTO-ENTMCNC: 34.4 G/DL (ref 31.5–35.7)
MCV RBC AUTO: 89 FL (ref 79–97)
PLATELET # BLD AUTO: 200 X10E3/UL (ref 150–450)
POTASSIUM SERPL-SCNC: 4.4 MMOL/L (ref 3.5–5.2)
PROT SERPL-MCNC: 6.4 G/DL (ref 6–8.5)
RBC # BLD AUTO: 4.91 X10E6/UL (ref 3.77–5.28)
SODIUM SERPL-SCNC: 141 MMOL/L (ref 134–144)
TRIGL SERPL-MCNC: 111 MG/DL (ref 0–149)
VLDLC SERPL CALC-MCNC: 20 MG/DL (ref 5–40)
WBC # BLD AUTO: 6.1 X10E3/UL (ref 3.4–10.8)

## 2021-07-07 ENCOUNTER — TELEMEDICINE (OUTPATIENT)
Dept: INTEGRATIVE MEDICINE | Age: 71
End: 2021-07-07

## 2021-07-07 ENCOUNTER — TELEPHONE (OUTPATIENT)
Dept: INTEGRATIVE MEDICINE | Age: 71
End: 2021-07-07

## 2021-07-07 DIAGNOSIS — E06.3 HYPOTHYROIDISM DUE TO HASHIMOTO'S THYROIDITIS: ICD-10-CM

## 2021-07-07 DIAGNOSIS — E66.9 OBESITY (BMI 30-39.9): Primary | ICD-10-CM

## 2021-07-07 DIAGNOSIS — G47.00 INSOMNIA DISORDER RELATED TO KNOWN ORGANIC FACTOR: ICD-10-CM

## 2021-07-07 DIAGNOSIS — R74.8 ABNORMAL LIVER ENZYMES: ICD-10-CM

## 2021-07-07 DIAGNOSIS — E11.9 TYPE 2 DIABETES MELLITUS WITHOUT COMPLICATION, WITHOUT LONG-TERM CURRENT USE OF INSULIN (CMS/HCC): ICD-10-CM

## 2021-07-07 DIAGNOSIS — N95.1 MENOPAUSE SYNDROME: ICD-10-CM

## 2021-07-07 DIAGNOSIS — E83.118 OTHER HEMOCHROMATOSIS: ICD-10-CM

## 2021-07-07 DIAGNOSIS — E78.00 HYPERCHOLESTEREMIA: ICD-10-CM

## 2021-07-07 PROCEDURE — INTG143 NEW COMPREHENSIVE INITIAL EVALUATION (SHORT VISIT): Mod: 95 | Performed by: FAMILY MEDICINE

## 2021-07-07 ASSESSMENT — ENCOUNTER SYMPTOMS
FATIGUE: 1
COUGH: 1
UNEXPECTED WEIGHT CHANGE: 1
WEAKNESS: 1
SHORTNESS OF BREATH: 1
HEADACHES: 1

## 2021-07-07 NOTE — TELEPHONE ENCOUNTER
Please let her know that I recommend Jeaneth Herb's Milk Thistle, take 3 capsules after dinner. I sent her the prescription through [facility].

## 2021-07-07 NOTE — PROGRESS NOTES
[facility] Encounter Note    Date: 2021    Name: Miesha Ryan    : 1950      Verification of Patient Location:  The patient affirms they are currently located in the following state: [place]    Request for Consent:    Audio Only Encounter   You and I are about to have a telemedicine check-in or visit. This is allowed because you have requested it. This telemedicine visit will be billed to your health insurance or you, if you are self-insured. You understand you will be responsible for any copayments or coinsurances that apply to your telemedicine visit. Before starting our telemedicine visit, I am required to get your consent for this virtual check-in or visit by telemedicine. Do you consent?     Before starting our telemedicine visit, I am required to get your consent for this virtual check-in or visit by telemedicine. Do you consent?    Patient Response to Request for Consent:  Yes    Time Spent  I spent 60 minutes on this date of service performing the following activities: providing counseling and education.    Reason for visit: DM-II, elevated BMI, HL    Allergies: Sulfa (sulfonamide antibiotics)    Medications:  []    Supplements: [cinnamon, berberine, tumeric, fish oil, Choleast, astragalus, vitamin d, elderberry, aspirin, acv]    Family History:   Family History   Problem Relation Age of Onset   • Cancer Biological Mother    • Cancer Biological Father          Social History:   Social History     Socioeconomic History   • Marital status:      Spouse name: Not on file   • Number of children: Not on file   • Years of education: Not on file   • Highest education level: Not on file   Occupational History   • Not on file   Tobacco Use   • Smoking status: Former Smoker   • Smokeless tobacco: Never Used   Substance and Sexual Activity   • Alcohol use: Yes     Alcohol/week: 7.0 standard drinks     Types: 7 Standard drinks or equivalent per week   • Drug  use: No   • Sexual activity: Not on file   Other Topics Concern   • Not on file   Social History Narrative   • Not on file     Social Determinants of Health     Financial Resource Strain:    • Difficulty of Paying Living Expenses:    Food Insecurity:    • Worried About Running Out of Food in the Last Year:    • Ran Out of Food in the Last Year:    Transportation Needs:    • Lack of Transportation (Medical):    • Lack of Transportation (Non-Medical):    Physical Activity:    • Days of Exercise per Week:    • Minutes of Exercise per Session:    Stress:    • Feeling of Stress :    Social Connections:    • Frequency of Communication with Friends and Family:    • Frequency of Social Gatherings with Friends and Family:    • Attends U[employer] Services:    • Active Member of Clubs or Organizations:    • Attends Club or Organization Meetings:    • Marital Status:    Intimate Partner Violence:    • Fear of Current or Ex-Partner:    • Emotionally Abused:    • Physically Abused:    • Sexually Abused:         Vitals: There were no vitals taken for this visit.    Weight:       Height:       BMI:There is no height or weight on file to calculate BMI.    BALTA: []    % Body Fat: []     % Muscle Mass: []    Visceral Fat:  []      NSR:      HPI  Working 16-17 hrs/day until Mid May. Has been more active, not sitting as much, gaining weight. Cleaning house, outside working, moving furniture, walking around yard. Gained 7 lbs since Christmas. Was doing [employer] meal plan, weight was stable but didn't lose weight. Walking 5,000-6,000 steps most days.     Diet: B: oatmeal or cottage cheese and fruit or egg sandwich. L: [employer] (diabetic plan) meal, tuna, chicken salad, fruit and cheese. D: May skip, not hungry. Snacks: Cottage cheese, Cheese and fruit, yogurt. Drinks: Unsweetened tea or decaf green tea. Not eating gluten-free. Has [employer] PaleoMeal Protein Powder. If you feel hungry, you can add 1 meal a day such as a salad with  protein (tuna fish, cottage cheese).     Not sleeping well, waking up several times a night to urinate and also having hot flashes.     AARON: Not using CPAP. Sleep Study 9/2020, moderate sleep apnea. Doesn't want to use CPAP.     Hypothyroidism: Taking Cytomel and Euthyrox. Labs checked in March.     Labs: AST 50, ALT 78, Alk P 171, , , HDL 48, , A1c 6.3. Ferritin 207.     ETOH: Light beer over ice, 2/day. Sometimes 3-4.     HL: Taking Mariel's Choleast, 2 BID. ASCVD 18.7%. Taking aspirin.     DM-II, A1c 6.3, taking cinnamon BID, berberine BID.     Elevated alkaline phosphatase, abdominal US done 10/2020 steatosis, GB unremarkable.     Folliculitis on face, dermatologist started her on doxycycline which helped but then developed worsening symptoms after stopping doxycycline.     Hereditary Hemochromatosis- elevated ferritin.     Postmenopausal, hysterectomy in 1992. Taking DHEA every other day. Was on HRT estrogen patch several years ago which helped.           Review of Systems   Constitutional: Positive for fatigue and unexpected weight change.   HENT: Positive for tinnitus.    Respiratory: Positive for cough and shortness of breath.    Genitourinary: Positive for urgency.   Neurological: Positive for weakness and headaches.         Physical Exam  Vitals reviewed.   Neurological:      Mental Status: She is alert.   Psychiatric:         Mood and Affect: Mood normal.         Behavior: Behavior normal.         Thought Content: Thought content normal.         Judgment: Judgment normal.           Other:      MSQ Score: [22]      ASSESSMENT: []  1. The patient reviewed and signed our practice consents at their first visit, including their pledge to obtain primary care services from a primary care physician and that we will not serve the role, and also will not bill insurance, as this is a cash only practice.  We also discussed the nature of an integrative medicine practice, and that we are offering  treatments that are complementary or alternative to traditional medical treatments, particularly when those treatments have not been successful in making the patient healthier.  Most patients come to this practice looking for treatments that are less toxic, and are also looking for approaches that are innovative or different from the traditional medical approach, with the hope of finding greater health through these other approaches.  The patient understands that these treatments are often less studied then medications approved by the FDA, and they still might have side effects, that require monitoring and follow-up visits in order to assess the patient's response to any treatments we recommend.  The patient also pledges to advise their primary care physician of any treatment programs we undertake.  Patient is free to call me, or communicate via email through our portal, with any questions about treatments we propose.  2. DM-II  3. Elevated BMI  4. Post-menopasual  5. Insomnia  6. Hemochromatosis  7. Sleep apnea  8. Elevated liver enzymes  9. Hypothyroidism  10. HL      PLAN:[]  1. DM-II: A1c at goal, continue cinnamon, berberine. Discussed low-carb diet and weight training twice a week.   2. Elevated BMI: Weight training twice a week, continue work around the house and yard and track steps. Start low-carb diet.  3. Post-menopausal: Continue DHEA every other day, start álvaro tea 1 cup up to 4 times a day. Recheck labs in 3 months.   4. Insomnia: Start Pure Encapsulation's Melatonin-SR, 3 mg 30 minutes before bed. Can increase to 6-9 mg if no improvement.   5. Hemochromatosis: Ferritin elevated, ? Obesity or DM-II. Avoid red meat, work on weight loss and lowering blood glucose. Recheck in 3 months.   6. Sleep apnea: Recommended CPAP, patient would like to hold off and may be interested in oral device in the future.   7. Elevated liver enzymes: Stop ETOH, recheck levels in 4 months.   8. Hypothyroidism: Continue T4, T3  replacement. Recheck labs in 4 months.   9. HL: Continue Choleast, start Konsyl Psyllium 1 tsp once a day x 1 week then increase to 1 tsp twice a day.     RTO:   [3 months]

## 2021-07-07 NOTE — PATIENT INSTRUCTIONS
Start Pure Encapsulation's Melatonin-SR, 1 capsule 30 minutes before bed. If no improvement in sleep you can increase to 2-3 tablets before bed.     For hot flashes, start álvaro tea by Jamel tea, 1 cup up to 4 times a day.     To help lower cholesterol, start Konsyl Psyllium, 1 teaspoon in your smoothie once a day x 1 week. If no changes in bowel movements, increase to 1 teaspoon twice a day.     Diet: 2 smoothies per day with water, protein powder, flax, cinnamon, berries, spinach. Snacks: Celery and carrots, Siete tortilla chips and salsa, plain green yogurt, cheese and berries, nuts: peanuts, cashews, almonds.     Eliminate alcohol for 4 weeks then recheck liver enzymes.     In 4 weeks, check CMP and thyroid labs.     Recheck other labs in 3 months.     Start weight training 30 minutes twice a week.

## 2021-07-22 ENCOUNTER — IMPORTED ENCOUNTER (OUTPATIENT)
Dept: URBAN - METROPOLITAN AREA CLINIC 59 | Facility: CLINIC | Age: 71
End: 2021-07-22

## 2021-07-22 PROBLEM — H25.13 BILATERAL NUCLEAR SCLEROSIS CATARACTS: Noted: 2021-07-22

## 2021-07-22 PROBLEM — E11.9 TYPE II DM W/O COMPLICATIONS: Noted: 2021-07-22

## 2021-07-22 PROBLEM — H04.123 TEAR FILM INSUFFICIENCY OF BILATERAL LACRIMAL GLANDS: Noted: 2021-07-22

## 2021-07-22 PROBLEM — H04.123 DRY EYE SYNDROME: Noted: 2021-07-22

## 2021-07-22 PROCEDURE — 92014 COMPRE OPH EXAM EST PT 1/>: CPT

## 2021-07-22 PROCEDURE — 92015 DETERMINE REFRACTIVE STATE: CPT

## 2021-08-10 LAB
ALBUMIN SERPL-MCNC: 4.1 G/DL (ref 3.7–4.7)
ALBUMIN/GLOB SERPL: 1.6 {RATIO} (ref 1.2–2.2)
ALP SERPL-CCNC: 114 IU/L (ref 48–121)
ALT SERPL-CCNC: 24 IU/L (ref 0–32)
AST SERPL-CCNC: 22 IU/L (ref 0–40)
BILIRUB SERPL-MCNC: 0.6 MG/DL (ref 0–1.2)
BUN SERPL-MCNC: 13 MG/DL (ref 8–27)
BUN/CREAT SERPL: 19 (ref 12–28)
CALCIUM SERPL-MCNC: 9.5 MG/DL (ref 8.7–10.3)
CHLORIDE SERPL-SCNC: 102 MMOL/L (ref 96–106)
CO2 SERPL-SCNC: 25 MMOL/L (ref 20–29)
CREAT SERPL-MCNC: 0.68 MG/DL (ref 0.57–1)
GLOBULIN SER CALC-MCNC: 2.6 G/DL (ref 1.5–4.5)
GLUCOSE SERPL-MCNC: 145 MG/DL (ref 65–99)
LAB CORP EGFR IF AFRICN AM: 102 ML/MIN/1.73
LAB CORP EGFR IF NONAFRICN AM: 88 ML/MIN/1.73
POTASSIUM SERPL-SCNC: 4.8 MMOL/L (ref 3.5–5.2)
PROT SERPL-MCNC: 6.7 G/DL (ref 6–8.5)
SODIUM SERPL-SCNC: 142 MMOL/L (ref 134–144)
T3FREE SERPL-MCNC: 4 PG/ML (ref 2–4.4)
T4 FREE SERPL-MCNC: 1.57 NG/DL (ref 0.82–1.77)
TSH SERPL DL<=0.005 MIU/L-ACNC: 0.05 UIU/ML (ref 0.45–4.5)

## 2021-09-27 ENCOUNTER — TELEPHONE (OUTPATIENT)
Dept: INTEGRATIVE MEDICINE | Age: 71
End: 2021-09-27

## 2021-09-27 NOTE — TELEPHONE ENCOUNTER
Miesha left a voicemail and stated she is having serious stomach issues. I contacted Miesha back and explained that Dr. Jensen is out of the office this week. I also advised Miesha if her symptoms were to severe to reach out to her PCP. Miesha did not want to go into detail on what her symptoms are as she did not want to have to repeat herself 3 and 4 times. She request a call back from Yakelin.

## 2021-09-27 NOTE — TELEPHONE ENCOUNTER
Spoke with Miesha, she is aware Dr. Jensen is off this week.  Yesterday after she made an omlette with spinach and mushrooms she had a tight, spasming pain at the bottom of her sternum, center of chest, that lasted 2 hours. She thinks it may be a hiatal hernia. This has happened a few times before but only lasted 5 min or so.   She has been taking acid release and reflex inhibitor, that has slippery elm in it for GERD and she states that is much improved.   I recommended she call PCP to see if she can get examined this week, she denied. I recommended to go to urgent care for an exam/diagnostic study, especially if it happens again. She has an appointment with Dr. Jensen on Tuesday 10/5.

## 2021-10-01 LAB
DHEA-S SERPL-MCNC: 152 UG/DL (ref 20.4–186.6)
ERYTHROCYTE [DISTWIDTH] IN BLOOD BY AUTOMATED COUNT: 12.6 % (ref 11.7–15.4)
ESTRADIOL SERPL-MCNC: 18.4 PG/ML
FERRITIN SERPL-MCNC: 131 NG/ML (ref 15–150)
HBA1C MFR BLD: 6.2 % (ref 4.8–5.6)
HCT VFR BLD AUTO: 47.1 % (ref 34–46.6)
HGB BLD-MCNC: 15.9 G/DL (ref 11.1–15.9)
INSULIN SERPL-ACNC: 36 UIU/ML (ref 2.6–24.9)
IRON SATN MFR SERPL: 34 % (ref 15–55)
IRON SERPL-MCNC: 110 UG/DL (ref 27–139)
MCH RBC QN AUTO: 31.1 PG (ref 26.6–33)
MCHC RBC AUTO-ENTMCNC: 33.8 G/DL (ref 31.5–35.7)
MCV RBC AUTO: 92 FL (ref 79–97)
PLATELET # BLD AUTO: 206 X10E3/UL (ref 150–450)
PROGEST SERPL-MCNC: 0.4 NG/ML
RBC # BLD AUTO: 5.12 X10E6/UL (ref 3.77–5.28)
T3FREE SERPL-MCNC: 3.9 PG/ML (ref 2–4.4)
T4 FREE SERPL-MCNC: 1.43 NG/DL (ref 0.82–1.77)
TESTOST FREE SERPL-MCNC: 6 PG/ML (ref 0–4.2)
TESTOST SERPL-MCNC: 30 NG/DL (ref 3–67)
TIBC SERPL-MCNC: 325 UG/DL (ref 250–450)
TSH SERPL DL<=0.005 MIU/L-ACNC: 0.04 UIU/ML (ref 0.45–4.5)
UIBC SERPL-MCNC: 215 UG/DL (ref 118–369)
WBC # BLD AUTO: 7 X10E3/UL (ref 3.4–10.8)

## 2021-10-04 NOTE — TELEPHONE ENCOUNTER
Pcpt: Symptoms resolved after taking supplement from [facility]. She has not had any symptoms since last Monday and will be seen in the office tomorrow. Discussed need for cardiac work up with chest pain. Patient has not seen her PCP in 6 years but is open to doing EKG tomorrow. Patient is to call office or go to nearest ER if symptoms return.

## 2021-10-05 ENCOUNTER — OFFICE VISIT (OUTPATIENT)
Dept: INTEGRATIVE MEDICINE | Age: 71
End: 2021-10-05

## 2021-10-05 VITALS
BODY MASS INDEX: 40.38 KG/M2 | HEIGHT: 67 IN | WEIGHT: 257.3 LBS | DIASTOLIC BLOOD PRESSURE: 78 MMHG | OXYGEN SATURATION: 97 % | TEMPERATURE: 98 F | RESPIRATION RATE: 20 BRPM | SYSTOLIC BLOOD PRESSURE: 126 MMHG | HEART RATE: 70 BPM

## 2021-10-05 DIAGNOSIS — R74.8 ABNORMAL LIVER ENZYMES: ICD-10-CM

## 2021-10-05 DIAGNOSIS — E06.3 HYPOTHYROIDISM DUE TO HASHIMOTO'S THYROIDITIS: ICD-10-CM

## 2021-10-05 DIAGNOSIS — N95.1 MENOPAUSE SYNDROME: ICD-10-CM

## 2021-10-05 DIAGNOSIS — E78.00 PURE HYPERCHOLESTEROLEMIA: ICD-10-CM

## 2021-10-05 DIAGNOSIS — R10.13 EPIGASTRIC PAIN: ICD-10-CM

## 2021-10-05 DIAGNOSIS — E11.8 CONTROLLED DIABETES MELLITUS TYPE 2 WITH COMPLICATIONS, UNSPECIFIED WHETHER LONG TERM INSULIN USE (CMS/HCC): ICD-10-CM

## 2021-10-05 DIAGNOSIS — R79.89 ELEVATED FERRITIN: ICD-10-CM

## 2021-10-05 DIAGNOSIS — R07.9 CHEST PAIN, UNSPECIFIED TYPE: Primary | ICD-10-CM

## 2021-10-05 PROCEDURE — 93000 ELECTROCARDIOGRAM COMPLETE: CPT | Performed by: FAMILY MEDICINE

## 2021-10-05 PROCEDURE — INTG104 PR FOLLOW-UP CONSULT (45 MIN): Performed by: FAMILY MEDICINE

## 2021-10-05 RX ORDER — LIOTHYRONINE SODIUM 25 UG/1
TABLET ORAL
Qty: 45 TABLET | Refills: 3 | Status: SHIPPED | OUTPATIENT
Start: 2021-10-05 | End: 2022-10-05 | Stop reason: SDUPTHER

## 2021-10-05 RX ORDER — LEVOTHYROXINE SODIUM 125 UG/1
125 TABLET ORAL
Qty: 90 TABLET | Refills: 3 | Status: SHIPPED | OUTPATIENT
Start: 2021-10-05 | End: 2021-12-06 | Stop reason: DRUGHIGH

## 2021-10-05 NOTE — PROGRESS NOTES
[facility] Follow Up Visit    Date: 10/5/2021    Name: Miesha Ryan    : 1950    Reason for visit: BMI 40, Hyperglycemia, HL, AARON, Hypothyroidism    Allergies: Sulfa (sulfonamide antibiotics)    Medications:   Current Outpatient Medications   Medication Sig Dispense Refill   • apple cider vinegar 500 mg tablet Take by mouth daily. Vitamin B12 60 mcg. vitamin b9 60 mcg. mona 50 mg     • aspirin 81 mg enteric coated tablet Take 81 mg by mouth daily.     • ASTRAGALUS ROOT, BULK, MISC 300 mg daily.      • biotin 10,000 mcg capsule Take by mouth daily.     • cholecalciferol, vitamin D3, (VITAMIN D3) 2,000 unit capsule Take 5,000 Units by mouth daily.      • CHOLESTEROL ORAL Take by mouth. Cholest by renay. 900 mg 2 daily      • COQ10, UBIQUINOL, ORAL Take 20 mg by mouth daily.      • HERBAL DRUGS ORAL Take by mouth. Cinnamon. 1800 mg 2 daily      • HERBAL DRUGS ORAL Take by mouth. Berberine. 1200 mg. 2 daily     • Lactobacillus acidophilus (PROBIOTIC ORAL) Take by mouth. IFLORA. 243 mg. 1 daily     • levothyroxine (EUTHYROX) 137 mcg tablet Take 1 tablet (137 mcg total) by mouth daily. 90 tablet 3   • liothyronine (CYTOMEL) 25 mcg tablet Take 1/2 pill po qd 45 tablet 3   • MILK THISTLE ORAL Take by mouth.     • multivit,Ca,iron,min/FA/vck170 (DAILY ENERGY ORAL) Take by mouth. NOT TAKING      • mv-min/vit C/elderber/herb 124 (AIRBORNE ELDERBERRY ORAL) Take by mouth.     • mv-mn/iron/folic acid/herb 190 (VITAMIN D3 COMPLETE ORAL) Take by mouth. Vitamin a 3000iu, d3 2000 iu, k2 100pg     • omega 3-dha-epa-fish oil (FISH OIL) 1,000 mg (120 mg-180 mg) capsule Take by mouth daily.      • prasterone, DHEA, (DHEA ORAL) Take by mouth daily.      • selenium sulfide (SELSUN BLUE TOP) Apply topically. Selsun Shampoo 2.25%. apply daily. Disp 1 bottle w/ 2 refills. 3/18/21 MB     • turmeric (CURCUMIN MISC) 500 mg 2 (two) times a day. Curcumin 1800 mg 2 daily. Black pepper 15 mg BID         No current facility-administered medications for this visit.       Supplements: [ cinnamon, berberine, tumeric, fish oil, Choleast, astragalus, vitamin d, elderberry, Konsyl psyllium, PE melatonin SR 3 mg, [facility] Marley Spoon Acid Relief]    Family History:   Family History   Problem Relation Age of Onset   • Cancer Biological Mother    • Cancer Biological Father          Social History:   Social History     Socioeconomic History   • Marital status:      Spouse name: None   • Number of children: None   • Years of education: None   • Highest education level: None   Occupational History   • None   Tobacco Use   • Smoking status: Former Smoker   • Smokeless tobacco: Never Used   Substance and Sexual Activity   • Alcohol use: Yes     Alcohol/week: 7.0 standard drinks     Types: 7 Standard drinks or equivalent per week   • Drug use: No   • Sexual activity: None   Other Topics Concern   • None   Social History Narrative   • None     Social Determinants of Health     Financial Resource Strain:    • Difficulty of Paying Living Expenses: Not on file   Food Insecurity:    • Worried About Running Out of Food in the Last Year: Not on file   • Ran Out of Food in the Last Year: Not on file   Transportation Needs:    • Lack of Transportation (Medical): Not on file   • Lack of Transportation (Non-Medical): Not on file   Physical Activity:    • Days of Exercise per Week: Not on file   • Minutes of Exercise per Session: Not on file   Stress:    • Feeling of Stress : Not on file   Social Connections:    • Frequency of Communication with Friends and Family: Not on file   • Frequency of Social Gatherings with Friends and Family: Not on file   • Attends Hoahaoism Services: Not on file   • Active Member of Clubs or Organizations: Not on file   • Attends Club or Organization Meetings: Not on file   • Marital Status: Not on file   Intimate Partner Violence:    • Fear of Current or Ex-Partner: Not on file   • Emotionally Abused: Not on  "file   • Physically Abused: Not on file   • Sexually Abused: Not on file   Housing Stability:    • Unable to Pay for Housing in the Last Year: Not on file   • Number of Places Lived in the Last Year: Not on file   • Unstable Housing in the Last Year: Not on file        Vitals:   Visit Vitals  /78 (BP Location: Left upper arm, Patient Position: Sitting)   Pulse 70   Temp 36.7 °C (98 °F) (Temporal)   Resp 20   Ht 1.702 m (5' 7\")   Wt 117 kg (257 lb 4.8 oz)   SpO2 97%   BMI 40.30 kg/m²       Weight: Weight: 117 kg (257 lb 4.8 oz)     Height: Height: 170.2 cm (5' 7\")     BMI:Body mass index is 40.3 kg/m².    BALTA: [ ]    % Body Fat: 50.8    % Muscle Mass: 69.7    Visceral fat:  20      NSR:       HPI  Epigastric pressure described as a spasm, lasts 30 seconds, occurs 2 hours after eating. Typically happens twice a week for the last 5 months. Taking Zyncros Acid Relief daily. Denies CP, SOB, lightheadedness.     AARON, not using CPAP.     HL, taking Choleast, ASA.    Hypothyroidism, taking Cytomel, Levothyroxine.     Hyperglycemia, taking cinnamon, berberine.     Unable to lose weight. Fatigued. Working 4 days/week. Not exercising. Tried [employer] meal delivery, lost 12 lbs. Not hungry.     H/o elevated Alk phos, steatosis 10/2020.     Hemochromatosis, elevated ferritin.    Postmenopausal, hysterectomy 1992, taking DHEA every other day. Night sweats.     2 alcoholic drinks/day.     Labs: A1c 6.2, insulin 36, TSH 0.036    Review of Systems        Physical Exam  Vitals reviewed.   Cardiovascular:      Rate and Rhythm: Normal rate and regular rhythm.   Pulmonary:      Effort: Pulmonary effort is normal.      Breath sounds: Normal breath sounds.   Neurological:      Mental Status: She is alert.   Psychiatric:         Mood and Affect: Mood normal.         Behavior: Behavior normal.         Thought Content: Thought content normal.         Judgment: Judgment normal.           Other:      ASSESSMENT and PLAN:  1. [ " ]  1. The patient reviewed and signed our practice consents at their first visit, including their pledge to obtain primary care services from a primary care physician and that we will not serve the role, and also will not bill insurance, as this is a cash only practice.  We also discussed the nature of an integrative medicine practice, and that we are offering treatments that are complementary or alternative to traditional medical treatments, particularly when those treatments have not been successful in making the patient healthier.  Most patients come to this practice looking for treatments that are less toxic, and are also looking for approaches that are innovative or different from the traditional medical approach, with the hope of finding greater health through these other approaches.  The patient understands that these treatments are often less studied then medications approved by the FDA, and they still might have side effects, that require monitoring and follow-up visits in order to assess the patient's response to any treatments we recommend.  The patient also pledges to advise their primary care physician of any treatment programs we undertake.  Patient is free to call me, or communicate via email through our portal, with any questions about treatments we propose.  2. BMI 40: Discussed Wegovy and decreasing carbohydrate intake along with addressing untreated AARON as discussed below.   3. Hyperglycemia: Recommend Wegovy once a week to help with weight loss and hyperglycemia. Medication information printed for patient. Start intermittent fasting 16:8 when not working. Decrease carbohydrate intake.   4. Hyperlipidemia: ASCVD 18.7%. Patient does not want to take statin. Continue ASA, Choleast. Consider CAC score.   5. AARON: Recommend Pulmonology referral to discuss CPAP.   6. Hypothyroidism: TSH over suppressed. Decrease Levothyroxine to 125 mcg/day. Continue Cytomel. Recheck labs in 4 months.   7. Epigastric  pain: EKG NSR, no sob. Check RUQ US to rule out cholelithiasis. Continue GERD supplement.             RTO:   [ 4 mo]

## 2021-10-05 NOTE — PATIENT INSTRUCTIONS
Decrease Levothyroxine to 125 mcg daily. Continue Cytomel.    On days when you are not working, eat between noon and 8 pm.     Limit carbohydrates, fruits and starchy vegetables as much as possible. Breakfast: Yogurt- plain or vanilla. Limit added sugars to no more than 4-8 grams per serving. Smoothie: protein powder with 3/4 cup berries. You can add 1 tsp nut butter if need more calories. Lunch: Salad from [employer] with protein or make your own. Dinner: Similar to lunch. Snacks: nuts. Cheese and gluten free crackers.     Decrease melatonin to 2 mg before bedtime. I sent you the prescription through [employer].     Repeat fasting labs in February.     Consider consult with sleep specialist to discuss CPAP machine.     Ultrasound ordered to evaluate gallbladder.

## 2021-10-14 ENCOUNTER — TELEPHONE (OUTPATIENT)
Dept: INTEGRATIVE MEDICINE | Age: 71
End: 2021-10-14

## 2021-10-14 RX ORDER — SEMAGLUTIDE 0.25 MG/.5ML
0.25 INJECTION, SOLUTION SUBCUTANEOUS WEEKLY
Qty: 2 ML | Refills: 3 | Status: SHIPPED | OUTPATIENT
Start: 2021-10-14 | End: 2021-10-19

## 2021-10-14 NOTE — TELEPHONE ENCOUNTER
Wegovy sent to the pharmacy. Ozempic and Wegovy are the same medication, Semaglutide, just different brand names. She will start with 0.25 mg once a week. Call office in 4 weeks with update on current weight and will adjust dose as needed.

## 2021-10-14 NOTE — TELEPHONE ENCOUNTER
Junior called and would like to move forward with the medication you discussed with her, she said ozempic but I see in your notes wegovy.   She stated the [facility] we have on file is correct (she will be there at noon).

## 2021-10-14 NOTE — TELEPHONE ENCOUNTER
Junior called, the RX is $4400. I asked the pharmacist if we can start a pre-cert, if that would help to reduce the cost, and the pharmacist said no.   Junior would like to know if there is anything else we can try?

## 2021-10-15 NOTE — TELEPHONE ENCOUNTER
Spoke with pharmacist ozempic $535.00. Patient is aware, she can go onto [employer] website to sign up for coupon.

## 2021-10-15 NOTE — TELEPHONE ENCOUNTER
Spoke with pharmacist. There is no generic RX available. She stated she can run a test claim for Ozempic and fax over what the cost will be (the system is down right now)

## 2021-10-18 ENCOUNTER — TELEPHONE (OUTPATIENT)
Dept: INTEGRATIVE MEDICINE | Age: 71
End: 2021-10-18

## 2021-10-18 NOTE — TELEPHONE ENCOUNTER
Miesha called in and was wondering if she can ask a few questions before starting a prescription that was recently called in. She wanted to know the side effects before starting and didn't want to purchase until she has answers to her questions.

## 2021-10-18 NOTE — TELEPHONE ENCOUNTER
Miesha spoke to N[facility] and they recommended that we do a pre-cert for the Ozempic. Is this something you can start?

## 2021-10-19 RX ORDER — SEMAGLUTIDE 1.34 MG/ML
0.25 INJECTION, SOLUTION SUBCUTANEOUS WEEKLY
Qty: 1.5 ML | Refills: 1 | Status: SHIPPED | OUTPATIENT
Start: 2021-10-19 | End: 2021-11-18

## 2021-10-19 NOTE — TELEPHONE ENCOUNTER
Spoke with Junior, she is switching her prescription coverage, after speaking with her . With the new coverage the first month will be $500, to meet her deductible, then $100 a month.   She would like the RX for ozempic sent into her pharmacy.   She will kermit back in a month with an update.

## 2021-11-04 ENCOUNTER — TELEPHONE (OUTPATIENT)
Dept: INTEGRATIVE MEDICINE | Age: 71
End: 2021-11-04

## 2021-11-04 NOTE — TELEPHONE ENCOUNTER
Called patient to let her know that the EKG fee of $65.00 was cleared on her account due to our error.   [phone] ([place])

## 2021-11-16 ENCOUNTER — TELEPHONE (OUTPATIENT)
Dept: INTEGRATIVE MEDICINE | Age: 71
End: 2021-11-16

## 2021-11-16 DIAGNOSIS — E06.3 HYPOTHYROIDISM DUE TO HASHIMOTO'S THYROIDITIS: ICD-10-CM

## 2021-11-16 DIAGNOSIS — R10.84 GENERALIZED ABDOMINAL PAIN: Primary | ICD-10-CM

## 2021-11-16 NOTE — TELEPHONE ENCOUNTER
"Spoke with patient, started Ozempic 4 weeks ago today.  She has been nauseous on and off.   She saw one side effect is pancreatis, and she has a \"bruised feeling\" on right side, not painful, not a pulling sensation. Can only feel it when she moves a certain way or lays down a certain way.  Thursday she did not take supplements, Friday morning only thyroid meds, Friday night no supplements again.   Saturday morning thyroid meds, and no supplements. By Saturday afternoon brusing feeling went away. Acid reflex came back on Saturday.   Only took thyroid meds on Sunday.  Very fatigued now.   Could supplements be counteracting Ozempic?   Only took vitamin d, colace, berberine today and yesterday.  Weight is 253 lbs. Weight on 10/5/21 in office was 257 lbs.  "

## 2021-12-03 ENCOUNTER — TELEPHONE (OUTPATIENT)
Dept: INTEGRATIVE MEDICINE | Age: 71
End: 2021-12-03
Payer: MEDICARE

## 2021-12-03 DIAGNOSIS — E06.3 HYPOTHYROIDISM DUE TO HASHIMOTO'S THYROIDITIS: Primary | ICD-10-CM

## 2021-12-03 LAB
LIPASE SERPL-CCNC: 45 U/L (ref 14–85)
T3FREE SERPL-MCNC: 4.3 PG/ML (ref 2–4.4)
T4 FREE SERPL-MCNC: 1.51 NG/DL (ref 0.82–1.77)
TSH SERPL DL<=0.005 MIU/L-ACNC: 0.02 UIU/ML (ref 0.45–4.5)

## 2021-12-03 NOTE — TELEPHONE ENCOUNTER
Miesha requested a call back from Dr. Jensen. She stated she is having stomach pain and would like to speak to Dr. Jensen directly.

## 2021-12-03 NOTE — TELEPHONE ENCOUNTER
Is having abdominal spasms lasting 2-3 hours, happens after she eats. The spasms is in the middle of her stomach. Has been drinking peppermint tea when the spasms start, and that has been helping.   Abdominal U/S ordered in October, still has not had done. She will call to see if she can get an appointment today to have U/S performed, if she cannot get in she will go to urgent care.   E-mailing lab slip.

## 2021-12-06 DIAGNOSIS — R10.13 EPIGASTRIC PAIN: ICD-10-CM

## 2021-12-06 RX ORDER — LEVOTHYROXINE SODIUM 100 UG/1
100 TABLET ORAL
Qty: 90 TABLET | Refills: 3 | Status: SHIPPED | OUTPATIENT
Start: 2021-12-06 | End: 2022-07-19 | Stop reason: DRUGHIGH

## 2021-12-06 NOTE — TELEPHONE ENCOUNTER
Levothyroxine 100 mcg sent to pharmacy. She will need to recheck thyroid labs in 6 weeks. I recommend that she schedule an appointment with a gastroenterologist. She can call Dr. العلي's office to schedule an appointment with Dr. العلي or another provider based on earliest availability. His office number is  [phone].

## 2021-12-06 NOTE — TELEPHONE ENCOUNTER
Spoke with Junior. Went to urgent care on Friday, physician prescribed Carafate.   Junior would like to know if you think this will cure her?  She is getting U/S done today at [facility] in [facility]. 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 Do you think she should get in contact with a surgeon?  Also she stated you were going to change her thyroid RX and the pharmacy has not received a RX yet.

## 2021-12-07 NOTE — TELEPHONE ENCOUNTER
Junior called, She cannot get into see a GI doc until the middle of January. She cannot eat without being in pain. She stated she was in tears for four hours with the abdominal pain. She is requesting a plan B.   I called [facility] and they are faxing over U/S report that she had done last night.  She is aware Dr. Jensen is out of the office and if the pain continues to go to ED.

## 2021-12-07 NOTE — TELEPHONE ENCOUNTER
As discussed during our phone call this afternoon the other option is to start the Famotidine to see if this helps her symptoms. I recommend that she keep her appointment with GI.

## 2021-12-07 NOTE — TELEPHONE ENCOUNTER
Spoke to patient, still having epigastric pain after eating. Has been eating broth and toast. Went to urgent care and started taking Carafate which helped initially but now pain has returned. + burping. Denies radiation of pain, nausea, diarrhea or constipation. She can't get in appointment with Dr. العلي until April. At urgent care she was given a script for Famotidine 20 mg. P: Stop Carafate, start Famotidine 20 mg bid.     Discussed results of MRI, no cholecystitis or cholelithiasis. Mass above R kidney which patient states has been present for 15 years. She last had imaging done by Dr. Stewart.     Yakelin please call Dr. Stewart's office to see if they can fax us the most recent MRI/CT abdomen results so we can compare size of mass. Her office number is [phone].

## 2021-12-07 NOTE — TELEPHONE ENCOUNTER
Received imaging results.   She would like to know if there are any other options to getting an endoscopy?   I told Junior that usually patients need a consult with the GI physician then the practice schedules the Upper GI, it is not a rx that we write.

## 2021-12-08 NOTE — TELEPHONE ENCOUNTER
Miesha called and stated that the medicine is not helping. She would like to know what she can do next.

## 2021-12-08 NOTE — TELEPHONE ENCOUNTER
Please let her know that it may take several days for the medication to help. She can take TUMS over the counter.

## 2021-12-08 NOTE — TELEPHONE ENCOUNTER
Miesha stated that she has tried a few more Gi doctors and the earliest she can be seen in end of march. She stated she is in severe pain, cant eat, cant sleep and does not know what to do. I explained to Miesha that Dr. Jensen is out until Monday and if symptoms persist or get worse, she should seek medical attention at the closest ER or urgent care.

## 2021-12-09 NOTE — TELEPHONE ENCOUNTER
Miesha called and stated she got an appointment with Dr. Ellyn Jimenez in [place], [place]. She requested requests be sent over.   Phone: [phone]  Fax: [phone]    I am faxing records over.

## 2021-12-09 NOTE — TELEPHONE ENCOUNTER
Please reach out to Miesha today and let her know that if she is still in severe pain that I advise her to go back to urgent care or the ER.

## 2021-12-14 ENCOUNTER — TELEPHONE (OUTPATIENT)
Dept: INTEGRATIVE MEDICINE | Age: 71
End: 2021-12-14
Payer: MEDICARE

## 2021-12-14 NOTE — TELEPHONE ENCOUNTER
Miesha called and wanted to give an update.  She saw Dr. Ragland from GI today and will be getting an UGI soon (his office will be contacting her to schedule).  He put her on pantoprazole and ordered labs.  She wanted Dr. Jensen to know she is very appreciative that she spoke with her last week.

## 2021-12-20 ENCOUNTER — TELEPHONE (OUTPATIENT)
Dept: INTEGRATIVE MEDICINE | Age: 71
End: 2021-12-20
Payer: MEDICARE

## 2021-12-20 NOTE — TELEPHONE ENCOUNTER
Please let patient know that I reviewed her results and am in agreement with the GI doctor's assessment and plan. She should schedule the CT scan to further evaluate the liver. I also recommend scheduling an appointment with a hematologist as it may take several weeks before she is able to be see. She should also abstain from alcohol use.

## 2021-12-20 NOTE — TELEPHONE ENCOUNTER
Spoke with patient, she is very anxious about UGI results (see CareEverywhere) from today.  GI recommended hematologist for evaluation of hemachromotosis also recommended CT of abdomen.  She is feeling very overwhelmed, anxious and confused, not sure what priority is.  Please advise

## 2021-12-20 NOTE — TELEPHONE ENCOUNTER
Spoke with Miesha and relayed information.  Patient is agreeable to scheduling CT and hemotology appointment.

## 2021-12-20 NOTE — TELEPHONE ENCOUNTER
Patient's labs are consistent with fatty liver. I recommend that she aims to lose 5-7% of her current body weight at a rate of 1-2 pounds per week through diet and exercise. She can follow the dietary guidelines outlined during her most recent office visit and aim to exercise for 30 minutes 5 days a week. She should follow up with GI to see if they have any further recommendations at this time.

## 2021-12-21 NOTE — TELEPHONE ENCOUNTER
----- Message from Praveen Garcia sent at 12/21/2021  8:17 AM EST -----  Regarding: FW: questions    ----- Message -----  From: Miesha Ryan  Sent: 12/21/2021   8:08 AM EST  To: [facility]  Subject: questions                                        Good morning,  If possible, could you or Siri call me this morning.  I have many questions and am terrified about this news and testing.  I am confused about results and future treatment. I  need more information before I can make any decisions.    Can you please send me a copy of the GI report.  [facility] will not email and it takes at least a week to get set up in their portal.      I am sorry to be  a bother but I just don't know what to do and I don't understand what is happening.    Thank you.  Junior Ryan

## 2021-12-21 NOTE — TELEPHONE ENCOUNTER
Spoke with Miesha at length regarding plan of care.  Reassured that Dr. Jensen is in agreement with GI plan for additional labs and studies.  Patient may follow-up at [facility] with liver specialist and have all testing and imaging there.  Patient encouraged to develop relationship with primary care physician so she can have a resource for communications with speciality offices.

## 2022-02-01 LAB
ALBUMIN SERPL-MCNC: 4.2 G/DL (ref 3.7–4.7)
ALBUMIN/GLOB SERPL: 2 {RATIO} (ref 1.2–2.2)
ALP SERPL-CCNC: 113 IU/L (ref 44–121)
ALT SERPL-CCNC: 20 IU/L (ref 0–32)
APO B SERPL-MCNC: 88 MG/DL
AST SERPL-CCNC: 22 IU/L (ref 0–40)
BILIRUB SERPL-MCNC: 0.3 MG/DL (ref 0–1.2)
BUN SERPL-MCNC: 14 MG/DL (ref 8–27)
BUN/CREAT SERPL: 23 (ref 12–28)
CALCIUM SERPL-MCNC: 9.1 MG/DL (ref 8.7–10.3)
CHLORIDE SERPL-SCNC: 100 MMOL/L (ref 96–106)
CHOLEST SERPL-MCNC: 169 MG/DL (ref 100–199)
CO2 SERPL-SCNC: 25 MMOL/L (ref 20–29)
CREAT SERPL-MCNC: 0.62 MG/DL (ref 0.57–1)
DHEA-S SERPL-MCNC: 94.9 UG/DL (ref 20.4–186.6)
ESTRADIOL SERPL-MCNC: 12.5 PG/ML
FERRITIN SERPL-MCNC: 105 NG/ML (ref 15–150)
GLOBULIN SER CALC-MCNC: 2.1 G/DL (ref 1.5–4.5)
GLUCOSE SERPL-MCNC: 132 MG/DL (ref 65–99)
HBA1C MFR BLD: 6.4 % (ref 4.8–5.6)
HDL SERPL-SCNC: 34.1 UMOL/L
HDLC SERPL-MCNC: 43 MG/DL
INSULIN SERPL-ACNC: 39.5 UIU/ML (ref 2.6–24.9)
LAB CORP EGFR IF AFRICN AM: 104 ML/MIN/1.73
LAB CORP EGFR IF NONAFRICN AM: 90 ML/MIN/1.73
LDL SERPL QN: 21.1 NM
LDL SERPL-SCNC: 1204 NMOL/L
LDL SMALL SERPL-SCNC: 428 NMOL/L
LDLC SERPL CALC-MCNC: 97 MG/DL (ref 0–99)
LP-IR SCORE SERPL: 64
POTASSIUM SERPL-SCNC: 4.2 MMOL/L (ref 3.5–5.2)
PROGEST SERPL-MCNC: <0.1 NG/ML
PROT SERPL-MCNC: 6.3 G/DL (ref 6–8.5)
SODIUM SERPL-SCNC: 140 MMOL/L (ref 134–144)
T3FREE SERPL-MCNC: 4.1 PG/ML (ref 2–4.4)
T4 FREE SERPL-MCNC: 1.24 NG/DL (ref 0.82–1.77)
TESTOST FREE SERPL-MCNC: 3.1 PG/ML (ref 0–4.2)
TESTOST SERPL-MCNC: 27 NG/DL (ref 3–67)
TRIGL SERPL-MCNC: 168 MG/DL (ref 0–149)
TSH SERPL-ACNC: 0.09 UIU/ML (ref 0.45–4.5)

## 2022-02-02 VITALS — WEIGHT: 250 LBS | BODY MASS INDEX: 39.16 KG/M2

## 2022-02-02 RX ORDER — PANTOPRAZOLE SODIUM 20 MG/1
40 TABLET, DELAYED RELEASE ORAL DAILY
COMMUNITY
End: 2022-03-01 | Stop reason: SDUPTHER

## 2022-02-02 NOTE — PROGRESS NOTES
Verification of Patient Location:  The patient affirms they are currently located in the following state: [place]     Request for Consent:    Audio and Video Encounter   Saloni, my name is Kristie Jensen DO.  Before we proceed, can you please verify your identification by telling me your full name and date of birth?    confirm    Can you tell me who is in the room with you?  alone     You and I are about to have a telemedicine check-in or visit because you have requested it.  This is a live video-conference.  I am a real person, speaking to you in real time.  There is no one else with me on the video-conference.  However, when we use ([employer], [employer], etc) it is important for you to know that the video-conference may not be secure or private.  I am not recording this conversation and I am asking you not to record it.  This telemedicine visit will be billed to your health insurance or you, if you are self-insured.  You understand you will be responsible for any copayments or coinsurances that apply to your telemedicine visit.  Communication platform used for this encounter:  [employer] Video Visit (with Zoom integration)       Before starting our telemedicine visit, I am required to get your consent for this virtual check-in or visit by telemedicine. Do you consent?     Patient Response to Request for Consent:  yes     Time Spent  I spent ** minutes on this date of service performing the following activities: providing counseling and education.

## 2022-02-02 NOTE — PROGRESS NOTES
[facility] Follow Up Visit    Date: 2022    Name: Miesha Ryan    : 1950    Reason for visit: GAVIN, Hyperglycemia, BMI 39, HL    Allergies: Sulfa (sulfonamide antibiotics)    Medications:   Current Outpatient Medications   Medication Sig Dispense Refill   • apple cider vinegar 500 mg tablet Take by mouth daily. Vitamin B12 60 mcg. vitamin b9 60 mcg. mona 50 mg     • aspirin 81 mg enteric coated tablet Take 81 mg by mouth daily.     • ASTRAGALUS ROOT, BULK, MISC 300 mg daily.      • biotin 10,000 mcg capsule Take by mouth daily.     • cholecalciferol, vitamin D3, (VITAMIN D3) 2,000 unit capsule Take 5,000 Units by mouth daily.      • CHOLESTEROL ORAL Take by mouth. Cholest by renay. 900 mg 2 daily      • COQ10, UBIQUINOL, ORAL Take 20 mg by mouth daily.      • HERBAL DRUGS ORAL Take by mouth. Cinnamon. 1800 mg 2 daily      • HERBAL DRUGS ORAL Take by mouth. Berberine. 1200 mg. 2 daily     • Lactobacillus acidophilus (PROBIOTIC ORAL) Take by mouth. IFLORA. 243 mg. 1 daily    Eating yogurt everyday      • levothyroxine 100 mcg tablet Take 1 tablet (100 mcg total) by mouth daily. 90 tablet 3   • liothyronine (CYTOMEL) 25 mcg tablet Take 1/2 pill po qd 45 tablet 3   • MILK THISTLE ORAL Take by mouth.     • mv-min/vit C/elderber/herb 124 (AIRBORNE ELDERBERRY ORAL) Take by mouth.     • mv-mn/iron/folic acid/herb 190 (VITAMIN D3 COMPLETE ORAL) Take by mouth. Vitamin a 3000iu, d3 2000 iu, k2 100pg     • omega 3-dha-epa-fish oil (FISH OIL) 1,000 mg (120 mg-180 mg) capsule Take by mouth daily.      • pantoprazole (PROTONIX) 20 mg EC tablet Take 20 mg by mouth daily.     • prasterone, DHEA, (DHEA ORAL) Take by mouth daily.      • multivit,Ca,iron,min/FA/zba045 (DAILY ENERGY ORAL) Take by mouth. NOT TAKING      • selenium sulfide (SELSUN BLUE TOP) Apply topically. Selsun Shampoo 2.25%. apply daily. Disp 1 bottle w/ 2 refills. 3/18/21 MB     • turmeric (CURCUMIN MISC) 500 mg 2  (two) times a day. Curcumin 1800 mg 2 daily. Black pepper 15 mg BID        No current facility-administered medications for this visit.       Supplements: [cinnamon, berberine, tumeric, fish oil, choleast, astragalus, vit d, elderberry, konsyl psyllium, PE SR melatonin 3 mg, terra health acid relief, dhea ]    Family History:   Family History   Problem Relation Age of Onset   • Cancer Biological Mother    • Cancer Biological Father          Social History:   Social History     Socioeconomic History   • Marital status:      Spouse name: Not on file   • Number of children: Not on file   • Years of education: Not on file   • Highest education level: Not on file   Occupational History   • Not on file   Tobacco Use   • Smoking status: Former Smoker   • Smokeless tobacco: Never Used   Substance and Sexual Activity   • Alcohol use: Yes     Alcohol/week: 7.0 standard drinks     Types: 7 Standard drinks or equivalent per week   • Drug use: No   • Sexual activity: Not on file   Other Topics Concern   • Not on file   Social History Narrative   • Not on file     Social Determinants of Health     Financial Resource Strain: Not on file   Food Insecurity: Not on file   Transportation Needs: Not on file   Physical Activity: Not on file   Stress: Not on file   Social Connections: Not on file   Intimate Partner Violence: Not on file   Housing Stability: Not on file        Vitals:   Visit Vitals  Wt 113 kg (250 lb)   BMI 39.16 kg/m²       Weight: Weight: 113 kg (250 lb)     Height:       BMI:Body mass index is 39.16 kg/m².    BALTA: [ ]    % Body Fat: [ ]    % Muscle Mass: [ ]    Visceral fat:  []      NSR:       HPI  GAVIN: Saw Gi at [facility] for second opinion, Fibrosure with severe steatosis with F2 fibrosis. Want her to stop tumeric and recheck liver enzymes. MR elastography ordered. Stopped tumeric in January, unsure of brand.     GERD: Saw GI doctor at [facility], started famotidine, pantoprazole. Symptoms improved  within 1-2 weeks after starting medication. Stopped famotidine.     Vascular calcification on Upper GI Series.     Hyperglycemia.     Labs: A1c 6.4, TSH 0.09, , . HDL 42, LDL 97, Apo B 88    Obesity, tried Ozempic, no weight loss, developed GERD. 3-4,000 steps/day at work. No exercise.     HL: Taking Choleast, doesn't want to take statin. Took Lipitor several years ago.     Hypothyroidism, taking Cytomel, Levothyroxine 100 mcg/day.     Post-menopausal, night sweats, taking DHEA qod.    Diet: Plans to see [facility] in June using Optifast. Currently doing 310 nutrition, has tea, greens, protein, all Organic.     B: Smoothie, protein powder, berries/banana, water.     L: Osman ALMAGUER meal, high protein, low carb (chicken and green bean, squash, pork, carrots, cauliflower)    D: PB sandwich     AARON, not using CPAP.     Review of Systems        Physical Exam  Vitals reviewed.   Neurological:      Mental Status: She is alert.   Psychiatric:         Mood and Affect: Mood normal.         Behavior: Behavior normal.         Thought Content: Thought content normal.         Judgment: Judgment normal.           Other:        ASSESSMENT and PLAN:  1. [ ]  1. The patient reviewed and signed our practice consents at their first visit, including their pledge to obtain primary care services from a primary care physician and that we will not serve the role, and also will not bill insurance, as this is a cash only practice.  We also discussed the nature of an integrative medicine practice, and that we are offering treatments that are complementary or alternative to traditional medical treatments, particularly when those treatments have not been successful in making the patient healthier.  Most patients come to this practice looking for treatments that are less toxic, and are also looking for approaches that are innovative or different from the traditional medical approach, with the hope of finding greater  health through these other approaches.  The patient understands that these treatments are often less studied then medications approved by the FDA, and they still might have side effects, that require monitoring and follow-up visits in order to assess the patient's response to any treatments we recommend.  The patient also pledges to advise their primary care physician of any treatment programs we undertake.  Patient is free to call me, or communicate via email through our portal, with any questions about treatments we propose.  2. GAVIN: Continue to follow up with GI at [facility]. Work on weigh reduction, 5-10% body weight. Discussed how certain supplements can contribute to liver dysfunction and discussed the importance of taking quality supplements. Patient will let our office know the brand of tumeric she was taking. Consider stopping Choleast if LFTs remain elevated.   3. Hyperglycemia: Jah food plan printed, see below for additional information.   4. HL: Recommend cardiology consult, statin. Patient wants to hold off at this time. Goal LDL <70. Continue to stress importance of anti-inflammatory diet, routine exercise. Consider increasing dose of fish oil. Patient to let our office know the current dose of fish oil she is taking.   5. BMI 39: Goal 4,000-5,000 steps/day. Start Jah food plan, limit fruit, starchy vegetables and grains to serving sizes listed on handout.   6. GERD: Consider reducing Pantoprazole from 40 mg to 20 mg. Verify current dosage and let my office know.               RTO:   [6 mo ]

## 2022-02-03 ENCOUNTER — TELEMEDICINE (OUTPATIENT)
Dept: INTEGRATIVE MEDICINE | Age: 72
End: 2022-02-03

## 2022-02-03 DIAGNOSIS — K75.81 NASH (NONALCOHOLIC STEATOHEPATITIS): Primary | ICD-10-CM

## 2022-02-03 DIAGNOSIS — E06.3 HYPOTHYROIDISM DUE TO HASHIMOTO'S THYROIDITIS: ICD-10-CM

## 2022-02-03 DIAGNOSIS — E78.00 PURE HYPERCHOLESTEROLEMIA: ICD-10-CM

## 2022-02-03 DIAGNOSIS — R79.89 ELEVATED FERRITIN: ICD-10-CM

## 2022-02-03 DIAGNOSIS — N95.1 MENOPAUSE SYNDROME: ICD-10-CM

## 2022-02-03 DIAGNOSIS — R73.9 HYPERGLYCEMIA: ICD-10-CM

## 2022-02-03 PROCEDURE — INTG104 PR FOLLOW-UP CONSULT (45 MIN): Mod: 95 | Performed by: FAMILY MEDICINE

## 2022-02-03 NOTE — PATIENT INSTRUCTIONS
Please send brand and dosage of tumeric and fish oil through patient portal.     Start Jah Food plan, limit fruit, starchy vegetables and grains to serving sizes listed on handout.     Start eating BistroMD meals 2 times/day.     Aim for 4,000-5,000 steps per day.     Check dosage of Pantoprazole when you get home.     Repeat fasting labs in July. Fast for 8 hours.     Consider cardiology referral.

## 2022-02-16 RX ORDER — ZINC SULFATE 50(220)MG
50 CAPSULE ORAL DAILY
COMMUNITY
End: 2022-07-19

## 2022-02-16 RX ORDER — ASCORBIC ACID 250 MG
1650 TABLET ORAL DAILY
COMMUNITY
End: 2022-07-19

## 2022-03-01 RX ORDER — PANTOPRAZOLE SODIUM 20 MG/1
20 TABLET, DELAYED RELEASE ORAL DAILY
Qty: 30 TABLET | Refills: 5 | Status: SHIPPED | OUTPATIENT
Start: 2022-03-01 | End: 2022-07-19

## 2022-03-17 ENCOUNTER — DOCUMENTATION (OUTPATIENT)
Dept: INTEGRATIVE MEDICINE | Age: 72
End: 2022-03-17
Payer: MEDICARE

## 2022-03-17 NOTE — PROGRESS NOTES
Please call patient and let her know I reviewed her ER summary and the dizziness may be from a viral infection which is not treated with a medication. If dizziness persists she should schedule visit with PCP or ENT to discuss treatment options.

## 2022-07-15 LAB
DHEA-S SERPL-MCNC: 246 UG/DL (ref 20.4–186.6)
ESTRADIOL SERPL-MCNC: 12.7 PG/ML
FERRITIN SERPL-MCNC: 56 NG/ML (ref 15–150)
HBA1C MFR BLD: 6.4 % (ref 4.8–5.6)
INSULIN SERPL-ACNC: 26.8 UIU/ML (ref 2.6–24.9)
PROGEST SERPL-MCNC: 0.2 NG/ML
T3FREE SERPL-MCNC: 3.6 PG/ML (ref 2–4.4)
T4 FREE SERPL-MCNC: 1.15 NG/DL (ref 0.82–1.77)
TESTOST FREE SERPL-MCNC: 3.8 PG/ML (ref 0–4.2)
TESTOST SERPL-MCNC: 49 NG/DL (ref 3–67)
TSH SERPL DL<=0.005 MIU/L-ACNC: 0.3 UIU/ML (ref 0.45–4.5)

## 2022-07-16 LAB
CHOLEST SERPL-MCNC: 145 MG/DL (ref 100–199)
HDL SERPL-SCNC: 31.5 UMOL/L
HDLC SERPL-MCNC: 47 MG/DL
LDL SERPL QN: 20.6 NM
LDL SERPL-SCNC: 1222 NMOL/L
LDL SMALL SERPL-SCNC: 565 NMOL/L
LDLC SERPL CALC-MCNC: 80 MG/DL (ref 0–99)
LP-IR SCORE SERPL: 74
TRIGL SERPL-MCNC: 94 MG/DL (ref 0–149)

## 2022-07-19 ENCOUNTER — TELEMEDICINE (OUTPATIENT)
Dept: INTEGRATIVE MEDICINE | Age: 72
End: 2022-07-19

## 2022-07-19 VITALS — BODY MASS INDEX: 40.13 KG/M2 | WEIGHT: 256.2 LBS

## 2022-07-19 DIAGNOSIS — K75.81 NASH (NONALCOHOLIC STEATOHEPATITIS): Primary | ICD-10-CM

## 2022-07-19 DIAGNOSIS — R73.9 HYPERGLYCEMIA: ICD-10-CM

## 2022-07-19 DIAGNOSIS — E06.3 HYPOTHYROIDISM DUE TO HASHIMOTO'S THYROIDITIS: ICD-10-CM

## 2022-07-19 DIAGNOSIS — E78.00 PURE HYPERCHOLESTEROLEMIA: ICD-10-CM

## 2022-07-19 PROCEDURE — INTG104 PR FOLLOW-UP CONSULT (45 MIN): Mod: 95 | Performed by: FAMILY MEDICINE

## 2022-07-19 RX ORDER — LEVOTHYROXINE SODIUM 88 UG/1
88 TABLET ORAL
Qty: 90 TABLET | Refills: 1 | Status: SHIPPED | OUTPATIENT
Start: 2022-07-19 | End: 2022-09-14 | Stop reason: DRUGHIGH

## 2022-07-19 NOTE — PATIENT INSTRUCTIONS
Reduce Psyllium 1 tsp daily.     Discuss new nutrition plan with nutritionist. Protein powder with water for 2 meals daily and 3rd meal no more than 300 calories using recipes on [email or web address]    Will add CMP to recent labs to determine if liver enzymes within range.     Reduce DHEA to 2 times/week.     Recheck thyroid labs 8 weeks after starting new dosage of Levothyroxine, 88 mcg.     Start Nature Made Cholest-off, 2 softgels twice a day with your 2 largest meals.     Start Metagenic's Estrovera, 1 capsule every morning to help with hot flashes.    Discuss mouth appliance with dentist to correct sleep apnea.     [facility] [phone] [facility]      [facility] [address].      [place] [id number]

## 2022-07-19 NOTE — PROGRESS NOTES
Verification of Patient Location:  The patient affirms they are currently located in the following state: [place]  yes     Request for Consent:    Audio and Video Encounter   Saloni, my name is Kristie Jensen DO.  Before we proceed, can you please verify your identification by telling me your full name and date of birth?    confirm    Can you tell me who is in the room with you?  yes     You and I are about to have a telemedicine check-in or visit because you have requested it.  This is a live video-conference.  I am a real person, speaking to you in real time.  There is no one else with me on the video-conference.  However, when we use ([employer], [employer], etc) it is important for you to know that the video-conference may not be secure or private.  I am not recording this conversation and I am asking you not to record it.  This telemedicine visit will be billed to your health insurance or you, if you are self-insured.  You understand you will be responsible for any copayments or coinsurances that apply to your telemedicine visit.  Communication platform used for this encounter:  [employer] Video Visit (with Zoom integration)       Before starting our telemedicine visit, I am required to get your consent for this virtual check-in or visit by telemedicine. Do you consent?      Patient Response to Request for Consent:  yes     Time Spent  I spent ** minutes on this date of service performing the following activities: providing counseling and education.    Update:  Went to [facility] to GI and Hepatology department, Dr ROMAINE wanted her to lose 10% of body weight in 3 months. [facility], doing smoothies with 310 organic protein powder with water, some fruit, cottage cheese, fruit, celery sticks, pb, carrots, scallops, green beans. Gained 6 lbs. Less than 1200 calories per day. Cheese and gf crackers. Has follow up tomorrow. Logging food on kevin, Lose It. Has f/u with Hepatologist in August. 2-3 meals/day.      + fatigue.     Vertigo in February, went to hospital, resolved in 48 hrs. Saw ENT.     Labs: A1c 6.4, DHEA 246, LDL-P 1,222, TSH 0.296    GERD, off of Pantoprazole and has rare reflux symptoms after lying down after eating. Tries not to eat after 4 or 4:30 in the afternoon.     AST, ALT elevated, Hepatologist wanted her to stop tumeric.     Supplements: [cinnamon, berberine bid, fish oil, choleast, astragalus, vit d, elderberry, konsyl psyllium, terra health acid relief, dhea qod ]    Not sleeping well. Very depressed. Waking up every 1.5 hrs to urinate.     AARON, doesn't want to use CPAP. Open to using a mouth guard.     Cologuard test done last week, doesn't have results yet.     Will hold off on endoscopy.     PHX:  GAVIN: Saw Gi at [facility] for second opinion, Fibrosure with severe steatosis with F2 fibrosis. Want her to stop tumeric and recheck liver enzymes. MR elastography ordered. Stopped tumeric in January, unsure of brand.      GERD: Saw GI doctor at [facility], started famotidine, pantoprazole. Symptoms improved within 1-2 weeks after starting medication. Stopped famotidine.      Vascular calcification on Upper GI Series.      Hyperglycemia.      Obesity, tried Ozempic, no weight loss, developed GERD. 3-4,000 steps/day at work. No exercise.      HL: Taking Choleast, doesn't want to take statin. Took Lipitor several years ago.      Hypothyroidism, taking Cytomel, Levothyroxine 100 mcg/day.      Post-menopausal, night sweats, taking DHEA qod.     Diet: Plans to see [facility] in June using Optifast. Currently doing 310 nutrition, has tea, greens, protein, all Organic.      AARON, not using CPAP.     A/P:  GAVIN: Work on weight reduction, can try 2 protein shakes and 300 calories for 3rd meal daily. Will recheck LFTs to determine if she can restart Tumeric.   Hypothyroidism: Decrease Levothyroxine to 88 mcg daily, recheck labs in 8 weeks. Continue Cytomel 1/2 tablet.    Hyperlipidemia: Continue Psyllium 1 tsp daily, Choleast. Start Nature Made Cholest-off, 2 capsules twice a day with 2 largest meals.   AARON: Discuss oral appliance with dentist.  Hot flashes: Start Metagenic's Estrovera, 1 capsule every morning. Work on blood sugar regulation. Reduce DHEA to 2 times/week.

## 2022-07-20 LAB
ALBUMIN SERPL-MCNC: 3.9 G/DL (ref 3.7–4.7)
ALBUMIN/GLOB SERPL: 1.7 {RATIO} (ref 1.2–2.2)
ALP SERPL-CCNC: 102 IU/L (ref 44–121)
ALT SERPL-CCNC: 17 IU/L (ref 0–32)
AST SERPL-CCNC: 16 IU/L (ref 0–40)
BILIRUB SERPL-MCNC: <0.2 MG/DL (ref 0–1.2)
BUN SERPL-MCNC: 10 MG/DL (ref 8–27)
BUN/CREAT SERPL: 16 (ref 12–28)
CALCIUM SERPL-MCNC: 9.2 MG/DL (ref 8.7–10.3)
CHLORIDE SERPL-SCNC: 103 MMOL/L (ref 96–106)
CO2 SERPL-SCNC: 22 MMOL/L (ref 20–29)
CREAT SERPL-MCNC: 0.63 MG/DL (ref 0.57–1)
EGFRCR SERPLBLD CKD-EPI 2021: 94 ML/MIN/1.73
GLOBULIN SER CALC-MCNC: 2.3 G/DL (ref 1.5–4.5)
GLUCOSE SERPL-MCNC: 137 MG/DL (ref 65–99)
POTASSIUM SERPL-SCNC: 4.4 MMOL/L (ref 3.5–5.2)
PROT SERPL-MCNC: 6.2 G/DL (ref 6–8.5)
SODIUM SERPL-SCNC: 141 MMOL/L (ref 134–144)

## 2022-08-02 LAB — SPECIMEN STATUS: NORMAL

## 2022-08-03 LAB — SPECIMEN STATUS: NORMAL

## 2022-09-14 DIAGNOSIS — E06.3 HYPOTHYROIDISM DUE TO HASHIMOTO'S THYROIDITIS: Primary | ICD-10-CM

## 2022-09-14 LAB
ALBUMIN SERPL-MCNC: 4.4 G/DL (ref 3.7–4.7)
ALBUMIN/GLOB SERPL: 1.8 {RATIO} (ref 1.2–2.2)
ALP SERPL-CCNC: 113 IU/L (ref 44–121)
ALT SERPL-CCNC: 22 IU/L (ref 0–32)
AST SERPL-CCNC: 19 IU/L (ref 0–40)
BILIRUB SERPL-MCNC: 0.7 MG/DL (ref 0–1.2)
BUN SERPL-MCNC: 10 MG/DL (ref 8–27)
BUN/CREAT SERPL: 15 (ref 12–28)
CALCIUM SERPL-MCNC: 9.9 MG/DL (ref 8.7–10.3)
CHLORIDE SERPL-SCNC: 100 MMOL/L (ref 96–106)
CO2 SERPL-SCNC: 28 MMOL/L (ref 20–29)
CREAT SERPL-MCNC: 0.65 MG/DL (ref 0.57–1)
EGFRCR-CYS SERPLBLD CKD-EPI 2021: 93 ML/MIN/1.73
GLOBULIN SER CALC-MCNC: 2.4 G/DL (ref 1.5–4.5)
GLUCOSE SERPL-MCNC: 143 MG/DL (ref 65–99)
POTASSIUM SERPL-SCNC: 5 MMOL/L (ref 3.5–5.2)
PROT SERPL-MCNC: 6.8 G/DL (ref 6–8.5)
SODIUM SERPL-SCNC: 143 MMOL/L (ref 134–144)
T3FREE SERPL-MCNC: 3.5 PG/ML (ref 2–4.4)
T4 FREE SERPL-MCNC: 1.16 NG/DL (ref 0.82–1.77)
TSH SERPL DL<=0.005 MIU/L-ACNC: 0.36 UIU/ML (ref 0.45–4.5)

## 2022-09-14 RX ORDER — LEVOTHYROXINE SODIUM 75 UG/1
75 TABLET ORAL
Qty: 90 TABLET | Refills: 3 | Status: SHIPPED | OUTPATIENT
Start: 2022-09-14 | End: 2023-06-01 | Stop reason: SDUPTHER

## 2022-10-04 ENCOUNTER — TELEPHONE (OUTPATIENT)
Dept: INTEGRATIVE MEDICINE | Age: 72
End: 2022-10-04
Payer: MEDICARE

## 2022-10-04 NOTE — TELEPHONE ENCOUNTER
Miesha requested a refill for liothyronine (CYTOMEL) 25 mcg tablet. She stated she reached out to [facility] and the prescription was .

## 2022-10-04 NOTE — TELEPHONE ENCOUNTER
Miesha Morrison requested a refill for liothyronine (CYTOMEL) 25 mcg tablet. She stated she reached out to [facility] and the prescription was .

## 2022-10-05 RX ORDER — LIOTHYRONINE SODIUM 25 UG/1
TABLET ORAL
Qty: 45 TABLET | Refills: 3 | Status: SHIPPED | OUTPATIENT
Start: 2022-10-05 | End: 2023-06-01 | Stop reason: SDUPTHER

## 2022-10-05 RX ORDER — LIOTHYRONINE SODIUM 25 UG/1
TABLET ORAL
Qty: 45 TABLET | Refills: 0 | OUTPATIENT
Start: 2022-10-05

## 2022-10-05 NOTE — TELEPHONE ENCOUNTER
Let Patient Know that her Prescription was sent to the Pharmacy. Also Patient wanted to know if you where able to see her test results from an Endoscopy and Colonoscopy she had at [facility] in August.

## 2022-10-11 ENCOUNTER — TELEMEDICINE (OUTPATIENT)
Dept: INTEGRATIVE MEDICINE | Age: 72
End: 2022-10-11

## 2022-10-11 ENCOUNTER — TELEPHONE (OUTPATIENT)
Dept: INTEGRATIVE MEDICINE | Age: 72
End: 2022-10-11

## 2022-10-11 VITALS — BODY MASS INDEX: 39.47 KG/M2 | WEIGHT: 252 LBS

## 2022-10-11 DIAGNOSIS — N95.1 MENOPAUSE SYNDROME: ICD-10-CM

## 2022-10-11 DIAGNOSIS — R73.9 HYPERGLYCEMIA: ICD-10-CM

## 2022-10-11 DIAGNOSIS — R79.89 ELEVATED FERRITIN: ICD-10-CM

## 2022-10-11 DIAGNOSIS — K75.81 NASH (NONALCOHOLIC STEATOHEPATITIS): Primary | ICD-10-CM

## 2022-10-11 DIAGNOSIS — E06.3 HYPOTHYROIDISM DUE TO HASHIMOTO'S THYROIDITIS: ICD-10-CM

## 2022-10-11 DIAGNOSIS — E78.00 PURE HYPERCHOLESTEROLEMIA: ICD-10-CM

## 2022-10-11 PROCEDURE — INTG104 PR FOLLOW-UP CONSULT (45 MIN): Mod: 95 | Performed by: FAMILY MEDICINE

## 2022-10-11 RX ORDER — FLASH GLUCOSE SCANNING READER
1 EACH MISCELLANEOUS DAILY
Qty: 1 EACH | Refills: 0 | Status: SHIPPED | OUTPATIENT
Start: 2022-10-11 | End: 2022-10-13

## 2022-10-11 RX ORDER — FLASH GLUCOSE SENSOR
1 KIT MISCELLANEOUS DAILY
Qty: 1 KIT | Refills: 0 | Status: SHIPPED | OUTPATIENT
Start: 2022-10-11 | End: 2022-10-13

## 2022-10-11 NOTE — PATIENT INSTRUCTIONS
Hot Flashes:  You can start either supplement below:  1) Black Cohosh (Remifemin 20 mg 1 - 2 bid).   2) Promensil Forte Red [place] 80 mg , One a day.      Repeat fasting labs in January, fast for 8 hours prior to lab draw.     Repeat thyroid labs in 3-4 weeks.     Continuous glucose monitor. Noble monitor sent to your pharmacy. Goal blood sugar . If you are getting a blood sugar spike above 140, please write down the food that you ate prior to the blood sugar spike. Please a keep a food log and write down the timing of each meal when wearing the monitor.     Sleep support:   Source Natural's Melatonin 2 mg, 1 capsule before bed for 1 week. If you are still not sleeping, increase to 4 mg (2 capsules) before bed.    Consider Raz stool testing to identify inflammation and support metabolism ($410). If you decide to do this test let my office know and we can ship the kit to your house.     Try to walk for 10 minutes after meals to lower blood sugar.

## 2022-10-11 NOTE — TELEPHONE ENCOUNTER
[facility] called they said the need a new script sent to them for the 14 day reader because of Medicare

## 2022-10-11 NOTE — TELEPHONE ENCOUNTER
Called the Pharmacy and told them the FreeStyle 14 reader was ok to give to her. I also let the patient know she needed to contact Medicare about covering the testing.

## 2022-10-13 RX ORDER — FLASH GLUCOSE SENSOR
1 KIT MISCELLANEOUS DAILY
Qty: 1 KIT | Refills: 0 | Status: SHIPPED | OUTPATIENT
Start: 2022-10-13

## 2022-10-13 RX ORDER — FLASH GLUCOSE SCANNING READER
1 EACH MISCELLANEOUS DAILY
Qty: 1 EACH | Refills: 0 | Status: SHIPPED | OUTPATIENT
Start: 2022-10-13

## 2022-10-14 ENCOUNTER — ESTABLISHED COMPREHENSIVE EXAM (OUTPATIENT)
Dept: URBAN - METROPOLITAN AREA CLINIC 22 | Facility: CLINIC | Age: 72
End: 2022-10-14

## 2022-10-14 DIAGNOSIS — H25.13: ICD-10-CM

## 2022-10-14 DIAGNOSIS — H04.123: ICD-10-CM

## 2022-10-14 DIAGNOSIS — E11.9: ICD-10-CM

## 2022-10-14 PROCEDURE — 92014 COMPRE OPH EXAM EST PT 1/>: CPT

## 2022-10-14 ASSESSMENT — VISUAL ACUITY
OD_CC: 20/20
OS_CC: 20/20

## 2022-10-14 ASSESSMENT — TONOMETRY
OS_IOP_MMHG: 16
OD_IOP_MMHG: 16

## 2022-11-14 NOTE — TELEPHONE ENCOUNTER
Pt called and asked if she could have a new Script of her Pantoprazole called into [facility] it is 20mg once a day 30 tablets with 5 refils

## 2022-11-15 RX ORDER — PANTOPRAZOLE SODIUM 20 MG/1
20 TABLET, DELAYED RELEASE ORAL DAILY
Qty: 30 TABLET | Refills: 5 | Status: SHIPPED | OUTPATIENT
Start: 2022-11-15 | End: 2023-04-05 | Stop reason: SDUPTHER

## 2022-11-29 LAB
ALBUMIN SERPL-MCNC: 4.6 G/DL (ref 3.7–4.7)
ALBUMIN/GLOB SERPL: 2.1 {RATIO} (ref 1.2–2.2)
ALP SERPL-CCNC: 119 IU/L (ref 44–121)
ALT SERPL-CCNC: 29 IU/L (ref 0–32)
APO B SERPL-MCNC: 89 MG/DL
AST SERPL-CCNC: 26 IU/L (ref 0–40)
BILIRUB SERPL-MCNC: 0.7 MG/DL (ref 0–1.2)
BUN SERPL-MCNC: 10 MG/DL (ref 8–27)
BUN/CREAT SERPL: 14 (ref 12–28)
CALCIUM SERPL-MCNC: 9.8 MG/DL (ref 8.7–10.3)
CHLORIDE SERPL-SCNC: 101 MMOL/L (ref 96–106)
CO2 SERPL-SCNC: 28 MMOL/L (ref 20–29)
CREAT SERPL-MCNC: 0.71 MG/DL (ref 0.57–1)
DHEA-S SERPL-MCNC: 99.9 UG/DL (ref 20.4–186.6)
EGFRCR SERPLBLD CKD-EPI 2021: 90 ML/MIN/1.73
ESTRADIOL SERPL-MCNC: 9.6 PG/ML
FERRITIN SERPL-MCNC: 87 NG/ML (ref 15–150)
GLOBULIN SER CALC-MCNC: 2.2 G/DL (ref 1.5–4.5)
GLUCOSE SERPL-MCNC: 139 MG/DL (ref 70–99)
HBA1C MFR BLD: 6.2 % (ref 4.8–5.6)
INSULIN SERPL-ACNC: 39.8 UIU/ML (ref 2.6–24.9)
POTASSIUM SERPL-SCNC: 5 MMOL/L (ref 3.5–5.2)
PROGEST SERPL-MCNC: 0.3 NG/ML
PROT SERPL-MCNC: 6.8 G/DL (ref 6–8.5)
SODIUM SERPL-SCNC: 143 MMOL/L (ref 134–144)
T3FREE SERPL-MCNC: 4.1 PG/ML (ref 2–4.4)
T4 FREE SERPL-MCNC: 0.95 NG/DL (ref 0.82–1.77)
TESTOST FREE SERPL-MCNC: 4.9 PG/ML (ref 0–4.2)
TESTOST SERPL-MCNC: 29 NG/DL (ref 3–67)
TSH SERPL DL<=0.005 MIU/L-ACNC: 1.59 UIU/ML (ref 0.45–4.5)

## 2022-11-30 LAB
CHOLEST SERPL-MCNC: 178 MG/DL (ref 100–199)
HDL SERPL-SCNC: 32 UMOL/L
HDLC SERPL-MCNC: 55 MG/DL
LDL SERPL QN: 20.7 NM
LDL SERPL-SCNC: 1247 NMOL/L
LDL SMALL SERPL-SCNC: 487 NMOL/L
LDLC SERPL CALC-MCNC: 95 MG/DL (ref 0–99)
LP-IR SCORE SERPL: 61
TRIGL SERPL-MCNC: 160 MG/DL (ref 0–149)

## 2022-12-21 ENCOUNTER — TELEPHONE (OUTPATIENT)
Dept: INTEGRATIVE MEDICINE | Age: 72
End: 2022-12-21
Payer: MEDICARE

## 2022-12-21 NOTE — TELEPHONE ENCOUNTER
I will need to know the correct names of the medications in order for them to be called into her pharmacy. Are they Meclizine and Zofran (Ondansetron)?

## 2022-12-21 NOTE — TELEPHONE ENCOUNTER
Patient called to ask if you can prescribe 2 meds that she was given back in October when she was in [place] and had a vertigo attack. She wants to know if you will prescribe them in case she gets another attack and wants them on hand. She is concerned with the bad weather coming. The one med is metlavac? And the other was a nausea medication that she put on her tongue and It dissolved. She is unsure of spelling the M med and name of nausea med. She would like them called into [facility] in [place]

## 2022-12-22 RX ORDER — MECLIZINE HYDROCHLORIDE 25 MG/1
25 TABLET ORAL 3 TIMES DAILY PRN
Qty: 30 TABLET | Refills: 0 | Status: SHIPPED | OUTPATIENT
Start: 2022-12-22 | End: 2023-01-01

## 2022-12-22 RX ORDER — ONDANSETRON 4 MG/1
4 TABLET, ORALLY DISINTEGRATING ORAL EVERY 8 HOURS PRN
Qty: 21 TABLET | Refills: 0 | Status: SHIPPED | OUTPATIENT
Start: 2022-12-22 | End: 2022-12-29

## 2022-12-22 RX ORDER — ONDANSETRON 4 MG/1
4 TABLET, ORALLY DISINTEGRATING ORAL EVERY 8 HOURS PRN
Qty: 21 TABLET | Refills: 0 | Status: SHIPPED | OUTPATIENT
Start: 2022-12-22 | End: 2022-12-22 | Stop reason: SDUPTHER

## 2022-12-22 RX ORDER — MECLIZINE HCL 25MG 25 MG/1
25 TABLET, CHEWABLE ORAL 3 TIMES DAILY PRN
Qty: 30 TABLET | Refills: 0 | Status: SHIPPED | OUTPATIENT
Start: 2022-12-22 | End: 2022-12-22 | Stop reason: SDUPTHER

## 2022-12-22 RX ORDER — MECLIZINE HCL 25MG 25 MG/1
25 TABLET, CHEWABLE ORAL 3 TIMES DAILY PRN
Qty: 30 TABLET | Refills: 0 | Status: SHIPPED | OUTPATIENT
Start: 2022-12-22 | End: 2022-12-22

## 2022-12-22 NOTE — TELEPHONE ENCOUNTER
Patient said to call into [facility] [address]. Valentina Borja   [phone]  Patient is at pharmacy if you can call in

## 2022-12-22 NOTE — TELEPHONE ENCOUNTER
Patient called in and said she needs the plain Meclizine. The chewable was called in. She needs the regular one. Please put brand necessary.

## 2023-01-03 ENCOUNTER — TELEMEDICINE (OUTPATIENT)
Dept: INTEGRATIVE MEDICINE | Age: 73
End: 2023-01-03

## 2023-01-03 VITALS — BODY MASS INDEX: 39.47 KG/M2 | WEIGHT: 252 LBS

## 2023-01-03 DIAGNOSIS — E06.3 HYPOTHYROIDISM DUE TO HASHIMOTO'S THYROIDITIS: ICD-10-CM

## 2023-01-03 DIAGNOSIS — K75.81 NASH (NONALCOHOLIC STEATOHEPATITIS): Primary | ICD-10-CM

## 2023-01-03 DIAGNOSIS — R79.89 ELEVATED FERRITIN: ICD-10-CM

## 2023-01-03 DIAGNOSIS — R73.9 HYPERGLYCEMIA: ICD-10-CM

## 2023-01-03 DIAGNOSIS — E78.00 PURE HYPERCHOLESTEROLEMIA: ICD-10-CM

## 2023-01-03 PROCEDURE — INTG104 PR FOLLOW-UP CONSULT (45 MIN): Mod: 95 | Performed by: FAMILY MEDICINE

## 2023-01-03 RX ORDER — FAMOTIDINE 20 MG/1
20 TABLET, FILM COATED ORAL DAILY
Qty: 30 TABLET | Refills: 3 | Status: SHIPPED | OUTPATIENT
Start: 2023-01-03 | End: 2023-04-05

## 2023-01-03 NOTE — PATIENT INSTRUCTIONS
Stop peppermint tea     Start Pepcid 20 mg, alternate with Pantoprazole every other day.     Repeat labs due end of February, fast for 8 hours.     Coronary Artery Calcium test ordered.     [facility] stool test: [phone]

## 2023-01-03 NOTE — PROGRESS NOTES
Verification of Patient Location:  The patient affirms they are currently located in the following state: [place]   YES  Request for Consent:    Audio and Video Encounter   Saloni, my name is Kristie Jensen DO.  Before we proceed, can you please verify your identification by telling me your full name and date of birth?    CONFIRMED    Can you tell me who is in the room with you?  NO     You and I are about to have a telemedicine check-in or visit because you have requested it.  This is a live video-conference.  I am a real person, speaking to you in real time.  There is no one else with me on the video-conference.  However, when we use ([employer], [employer], etc) it is important for you to know that the video-conference may not be secure or private.  I am not recording this conversation and I am asking you not to record it.  This telemedicine visit will be billed to your health insurance or you, if you are self-insured.  You understand you will be responsible for any copayments or coinsurances that apply to your telemedicine visit.  Communication platform used for this encounter:  [employer] Video Visit (with Zoom integration)       Before starting our telemedicine visit, I am required to get your consent for this virtual check-in or visit by telemedicine. Do you consent?   YES  Patient Response to Request for Consent:       Time Spent  I spent 45 minutes on this date of service performing the following activities: providing counseling and education.    Hyperglycemia, wearing Noble since Saturday. Tommy to 190 after eating greek yogurt,     121 at 3 am, 160 at 7 am. BG into the 60's at night.     ROMAINE, seen at [facility], recommending abdominal CT due to GERD symptoms.     Doing GOLO diet program, lost 8-9 lbs then GERD symptoms returned and was eating more crackers and ginger ale. No dairy, sugar, bread, cottage cheese ok. Mainly protein, fruit and vegetables. Olive oil for cooking.     B: Cottage cheese with fruit  and granola or eggs with avocado or muffin with rice flour. Snack: Nuts, fruit, homemade cookies. L: Protein like chicken or shrimp or scallops. Some pasta, whole wheat, vegetable on the side. Drinking herbal tea, no coffee.     GERD- back on PPI, Pantoprazole 20 mg prn a few times week. EGD in August, inflammation, recommended Pantoprazole for 3 weeks and symptoms improved. Taking gut alive supplement and other herbal supplement with licorice and mona.     Went to [place] in October, vertigo, went to the hospital. GERD symptoms worsened after this episode. Met her son she gave up for adoption 51 years ago. Still in contact with him once a day.     HL, taking Cholest-off, RYR.     LE swelling.     Supplements: [cinnamon, berberine bid, fish oil, choleast, astragalus, vit d, elderberry, konsyl psyllium, terra health acid relief, dhea 2x/week, Nature's Made Cholest-off 2 bid with meals, tumeric, GOLO Release, milk thistle]     Bowel movements daily, no bloating.     F/u with hepatologist in February.     A/P:  GERD: Alternate Pantoprazole and Pepcid 20 mg every other day. Recommend CT Abdomen.   GAVIN: Continue GOLO diet, f/u with hepatologist in February.   Hyperglycemia: CGM, continue to limit carbs.   HL: Continue RYR, Cholest-off. CAC scan ordered.

## 2023-01-09 DIAGNOSIS — Z01.812 BLOOD TESTS PRIOR TO TREATMENT OR PROCEDURE: Primary | ICD-10-CM

## 2023-01-11 LAB
BUN SERPL-MCNC: 11 MG/DL (ref 8–27)
BUN/CREAT SERPL: 17 (ref 12–28)
CALCIUM SERPL-MCNC: 9.6 MG/DL (ref 8.7–10.3)
CHLORIDE SERPL-SCNC: 100 MMOL/L (ref 96–106)
CO2 SERPL-SCNC: 26 MMOL/L (ref 20–29)
CREAT SERPL-MCNC: 0.65 MG/DL (ref 0.57–1)
EGFRCR SERPLBLD CKD-EPI 2021: 93 ML/MIN/1.73
GLUCOSE SERPL-MCNC: 174 MG/DL (ref 70–99)
POTASSIUM SERPL-SCNC: 4.5 MMOL/L (ref 3.5–5.2)
SODIUM SERPL-SCNC: 140 MMOL/L (ref 134–144)

## 2023-01-12 LAB
APPEARANCE UR: CLEAR
BACTERIA #/AREA URNS HPF: ABNORMAL /[HPF]
BACTERIA UR CULT: NORMAL
BACTERIA UR CULT: NORMAL
BILIRUB UR QL STRIP: NEGATIVE
CASTS URNS QL MICRO: ABNORMAL /LPF
COLOR UR: YELLOW
EPI CELLS #/AREA URNS HPF: ABNORMAL /HPF (ref 0–10)
GLUCOSE UR QL STRIP: NEGATIVE
HGB UR QL STRIP: NEGATIVE
KETONES UR QL STRIP: NEGATIVE
LAB CORP URINALYSIS REFLEX: NORMAL
LEUKOCYTE ESTERASE UR QL STRIP: NEGATIVE
MICRO URNS: NORMAL
MICRO URNS: NORMAL
NITRITE UR QL STRIP: NEGATIVE
PH UR STRIP: 6 [PH] (ref 5–7.5)
PROT UR QL STRIP: NEGATIVE
RBC #/AREA URNS HPF: ABNORMAL /HPF (ref 0–2)
SP GR UR STRIP: 1.02 (ref 1–1.03)
UROBILINOGEN UR STRIP-MCNC: 0.2 MG/DL (ref 0.2–1)
WBC #/AREA URNS HPF: ABNORMAL /HPF (ref 0–5)

## 2023-01-16 DIAGNOSIS — R82.71 BACTERIURIA: Primary | ICD-10-CM

## 2023-01-23 ENCOUNTER — TELEPHONE (OUTPATIENT)
Dept: INTEGRATIVE MEDICINE | Age: 73
End: 2023-01-23

## 2023-01-23 NOTE — TELEPHONE ENCOUNTER
Called Patient and let her know that Dr. Jensen suggested she call her Surgeon about her Stomach spasms she was having after gallbladder surgery on Friday.

## 2023-02-28 ENCOUNTER — TELEPHONE (OUTPATIENT)
Dept: INTEGRATIVE MEDICINE | Age: 73
End: 2023-02-28

## 2023-02-28 NOTE — TELEPHONE ENCOUNTER
Patient called she saw her Liver Doctor at [facility] today he wants her to stop all of her supplements until she gets a MRI done. She wanted to know if you think that is ok.

## 2023-04-05 ENCOUNTER — TELEMEDICINE (OUTPATIENT)
Dept: INTEGRATIVE MEDICINE | Age: 73
End: 2023-04-05

## 2023-04-05 VITALS — WEIGHT: 240 LBS | BODY MASS INDEX: 37.59 KG/M2

## 2023-04-05 DIAGNOSIS — E06.3 HYPOTHYROIDISM DUE TO HASHIMOTO'S THYROIDITIS: ICD-10-CM

## 2023-04-05 DIAGNOSIS — E78.00 PURE HYPERCHOLESTEROLEMIA: ICD-10-CM

## 2023-04-05 DIAGNOSIS — R73.9 HYPERGLYCEMIA: ICD-10-CM

## 2023-04-05 DIAGNOSIS — K75.81 NASH (NONALCOHOLIC STEATOHEPATITIS): Primary | ICD-10-CM

## 2023-04-05 PROCEDURE — INTG104 PR FOLLOW-UP CONSULT (45 MIN): Mod: 95 | Performed by: FAMILY MEDICINE

## 2023-04-05 RX ORDER — PANTOPRAZOLE SODIUM 20 MG/1
20 TABLET, DELAYED RELEASE ORAL DAILY
Qty: 30 TABLET | Refills: 2 | Status: SHIPPED | OUTPATIENT
Start: 2023-04-05 | End: 2023-05-05

## 2023-04-05 NOTE — PROGRESS NOTES
Verification of Patient Location:  The patient affirms they are currently located in the following state: [place]   YES      Request for Consent:    Audio and Video Encounter   Saloni, my name is Kristie Jensen DO.  Before we proceed, can you please verify your identification by telling me your full name and date of birth?    CONFIRMED    Can you tell me who is in the room with you?  NO     You and I are about to have a telemedicine check-in or visit because you have requested it.  This is a live video-conference.  I am a real person, speaking to you in real time.  There is no one else with me on the video-conference.  However, when we use ([employer], [employer], etc) it is important for you to know that the video-conference may not be secure or private.  I am not recording this conversation and I am asking you not to record it.  This telemedicine visit will be billed to your health insurance or you, if you are self-insured.  You understand you will be responsible for any copayments or coinsurances that apply to your telemedicine visit.  Communication platform used for this encounter:  LogMeIn Video Visit (with Zoom integration)       Before starting our telemedicine visit, I am required to get your consent for this virtual check-in or visit by telemedicine. Do you consent?     Patient Response to Request for Consent:  YES     Time Spent  I spent 45 minutes on this date of service performing the following activities: providing counseling and education.    S/p cholecystectomy, still having spastic epigastric pain. Taking Pantoprazole daily which helps with spasms. F/u at [place] 4/7 with biliary specialist to evaluate for ERCP procedure. GERD symptoms exacerbated. Eating crackers, apple sauce, chicken, turkey. If eats other foods, has epigastric pain. Spasms worse when she doesn't eat, wakes up and eats applesauce, crackers. GI wanted her to have LFTs checked every 2 weeks. Last set of labs checked 3/3/23, AST 37, ALT  92.    Prescription medications: Pantoprazole, Levothyroxine 75, Cytomel 25     Working 13-14 hrs/days.     GAVIN, liver biopsy: steatosis with minimal steatohepatitis, seeing GI, stable, referred to  weight loss clinic    Lost 16 lbs due to abdominal pain    Hyperglycemia, wore CGM in January prior to surgery. Blood sugar spiked with food lion flavored yogurt, ice cream, english muffins, rolls, beer, crackers, raisin bran, mac and cheese, pear and cranberry bread     Was following GOLO diet    Hyperlipidemia: Cholestoff, RYR    Previous Supplements (Stopped all supplements after cholecystectomy in January): cinnamon, berberine bid, fish oil, choleast, astragalus, D, elderberry, psyllium, terra health and relief, DHEA twice a week, NM cholestoff 2 bid, tumeric, golo release, milk thistle     Bowel movements sporadic, most days, harder or soft.     Hypothyroidism: Levothyroxine 75 mcg, Cytomel 25 mg.     Sleeping 6-7 hours/night.     A/P:   GAVIN: 10-15% weight loss, can discuss Vitamin E supplement with biliary specialist.  Epigastric pain: Continue Pantoprazole, refill sent to pharmacy. F/u with biliary specialist on Friday.  Hyperglycemia: Low-carbohydrate diet, routine exercise. Recheck A1c.   Hyperlipidemia: Recheck FLP.  Hypothyroidism: Continue Levothyroxine, Cytomel, recheck labs.

## 2023-04-05 NOTE — PATIENT INSTRUCTIONS
Discuss starting Vitamin E supplement with biliary specialist.    Recheck fasting labs, fast for 8 hours. Labs should be drawn no later than June 1st so I can review them before the practice closes on June 30th.     If you plan to get the coronary artery calcium test done, please complete by June 1st so I can review it before the practice closes on June 30th.

## 2023-04-21 LAB
ALBUMIN SERPL-MCNC: 4.3 G/DL (ref 3.7–4.7)
ALP SERPL-CCNC: 170 IU/L (ref 44–121)
ALT SERPL-CCNC: 64 IU/L (ref 0–32)
AST SERPL-CCNC: 30 IU/L (ref 0–40)
BILIRUB DIRECT SERPL-MCNC: 0.17 MG/DL (ref 0–0.4)
BILIRUB SERPL-MCNC: 0.4 MG/DL (ref 0–1.2)
CHOLEST SERPL-MCNC: 199 MG/DL (ref 100–199)
HBA1C MFR BLD: 6.4 % (ref 4.8–5.6)
HDLC SERPL-MCNC: 52 MG/DL
LDLC SERPL CALC-MCNC: 122 MG/DL (ref 0–99)
PROT SERPL-MCNC: 6.5 G/DL (ref 6–8.5)
T3FREE SERPL-MCNC: 3.8 PG/ML (ref 2–4.4)
T4 FREE SERPL-MCNC: 1.09 NG/DL (ref 0.82–1.77)
TRIGL SERPL-MCNC: 140 MG/DL (ref 0–149)
TSH SERPL DL<=0.005 MIU/L-ACNC: 0.7 UIU/ML (ref 0.45–4.5)
VLDLC SERPL CALC-MCNC: 25 MG/DL (ref 5–40)

## 2023-05-03 ENCOUNTER — TELEPHONE (OUTPATIENT)
Dept: INTEGRATIVE MEDICINE | Age: 73
End: 2023-05-03
Payer: MEDICARE

## 2023-05-03 DIAGNOSIS — R30.0 DYSURIA: Primary | ICD-10-CM

## 2023-05-03 NOTE — TELEPHONE ENCOUNTER
Patient called stating she has pressure when she urinates, no burning, She would like to know if there is anything she can take. Also the patient wanted you know when she had her procedure done her blood pressure was very high and had two other episodes of it. I did ask her if she talked to her PCP about it and she said no.

## 2023-05-03 NOTE — TELEPHONE ENCOUNTER
I will order a urine culture. I sent her a message through the portal regarding her blood pressure. Please email lab script to patient.

## 2023-05-08 RX ORDER — NITROFURANTOIN 25; 75 MG/1; MG/1
100 CAPSULE ORAL 2 TIMES DAILY
Qty: 10 CAPSULE | Refills: 0 | Status: SHIPPED | OUTPATIENT
Start: 2023-05-08 | End: 2023-05-13

## 2023-05-08 NOTE — TELEPHONE ENCOUNTER
Patient called and said she sent in the Urine test Friday and she can't wait until the results because she is having pressure and some burning. Can you recommend something for her.

## 2023-05-09 LAB
APPEARANCE UR: CLEAR
BACTERIA #/AREA URNS HPF: ABNORMAL /[HPF]
BACTERIA UR CULT: ABNORMAL
BACTERIA UR CULT: ABNORMAL
BILIRUB UR QL STRIP: NEGATIVE
CASTS URNS QL MICRO: ABNORMAL /LPF
COLOR UR: YELLOW
EPI CELLS #/AREA URNS HPF: ABNORMAL /HPF (ref 0–10)
GLUCOSE UR QL STRIP: NEGATIVE
HGB UR QL STRIP: NEGATIVE
KETONES UR QL STRIP: NEGATIVE
LAB CORP URINALYSIS REFLEX: ABNORMAL
LEUKOCYTE ESTERASE UR QL STRIP: ABNORMAL
MICRO URNS: ABNORMAL
NITRITE UR QL STRIP: NEGATIVE
OTHER ANTIBIOTIC SUSC ISLT: ABNORMAL
PH UR STRIP: 6.5 [PH] (ref 5–7.5)
PROT UR QL STRIP: NEGATIVE
RBC #/AREA URNS HPF: ABNORMAL /HPF (ref 0–2)
SP GR UR STRIP: 1.01 (ref 1–1.03)
UROBILINOGEN UR STRIP-MCNC: 0.2 MG/DL (ref 0.2–1)
WBC #/AREA URNS HPF: ABNORMAL /HPF (ref 0–5)

## 2023-05-22 ENCOUNTER — TELEPHONE (OUTPATIENT)
Dept: INTEGRATIVE MEDICINE | Age: 73
End: 2023-05-22

## 2023-05-22 NOTE — TELEPHONE ENCOUNTER
Patient called to give you an update: Her Liver Doctor Discharged her and will check her enzymes every 2 months for the next 6 months. I seems that the procedure worked and she is not having spasmus any more.Her BP is ok most of the time and maybe once a day it is over 140 and she is drinking the Hibiscus Tea.

## 2023-05-30 RX ORDER — LISINOPRIL 5 MG/1
5 TABLET ORAL DAILY
Qty: 30 TABLET | Refills: 2 | Status: SHIPPED | OUTPATIENT
Start: 2023-05-30 | End: 2023-06-21 | Stop reason: DRUGHIGH

## 2023-06-01 RX ORDER — LIOTHYRONINE SODIUM 25 UG/1
TABLET ORAL
Qty: 45 TABLET | Refills: 1 | Status: SHIPPED | OUTPATIENT
Start: 2023-06-01

## 2023-06-01 RX ORDER — LEVOTHYROXINE SODIUM 75 UG/1
75 TABLET ORAL
Qty: 90 TABLET | Refills: 1 | Status: SHIPPED | OUTPATIENT
Start: 2023-06-01 | End: 2024-05-31

## 2023-06-21 ENCOUNTER — TELEPHONE (OUTPATIENT)
Dept: INTEGRATIVE MEDICINE | Age: 73
End: 2023-06-21
Payer: MEDICARE

## 2023-06-21 RX ORDER — LISINOPRIL 10 MG/1
10 TABLET ORAL DAILY
Qty: 90 TABLET | Refills: 1 | Status: SHIPPED | OUTPATIENT
Start: 2023-06-21 | End: 2023-12-18

## 2023-06-21 NOTE — TELEPHONE ENCOUNTER
Pcpt: /92, 130/83, 147/86. Fatigue, headaches. P: Increase to Lisinopril 10 mg daily. Continue to recheck BP daily.

## 2023-07-11 LAB
ALBUMIN SERPL-MCNC: 4.6 G/DL (ref 3.8–4.8)
ALBUMIN/GLOB SERPL: 1.8 {RATIO} (ref 1.2–2.2)
ALP SERPL-CCNC: 108 IU/L (ref 44–121)
ALT SERPL-CCNC: 20 IU/L (ref 0–32)
AST SERPL-CCNC: 17 IU/L (ref 0–40)
BILIRUB SERPL-MCNC: 0.5 MG/DL (ref 0–1.2)
BUN SERPL-MCNC: 10 MG/DL (ref 8–27)
BUN/CREAT SERPL: 19 (ref 12–28)
CALCIUM SERPL-MCNC: 10 MG/DL (ref 8.7–10.3)
CHLORIDE SERPL-SCNC: 101 MMOL/L (ref 96–106)
CHOLEST SERPL-MCNC: 182 MG/DL (ref 100–199)
CO2 SERPL-SCNC: 25 MMOL/L (ref 20–29)
CREAT SERPL-MCNC: 0.52 MG/DL (ref 0.57–1)
EGFRCR SERPLBLD CKD-EPI 2021: 98 ML/MIN/1.73
FERRITIN SERPL-MCNC: 77 NG/ML (ref 15–150)
GLOBULIN SER CALC-MCNC: 2.5 G/DL (ref 1.5–4.5)
GLUCOSE SERPL-MCNC: 126 MG/DL (ref 70–99)
HBA1C MFR BLD: 6.2 % (ref 4.8–5.6)
HDL SERPL-SCNC: 34.9 UMOL/L
HDLC SERPL-MCNC: 55 MG/DL
INSULIN SERPL-ACNC: 42.6 UIU/ML (ref 2.6–24.9)
LDL SERPL QN: 21 NM
LDL SERPL-SCNC: 1549 NMOL/L
LDL SMALL SERPL-SCNC: 687 NMOL/L
LDLC SERPL CALC-MCNC: 102 MG/DL (ref 0–99)
LP-IR SCORE SERPL: 73
POTASSIUM SERPL-SCNC: 5.1 MMOL/L (ref 3.5–5.2)
PROT SERPL-MCNC: 7.1 G/DL (ref 6–8.5)
SODIUM SERPL-SCNC: 142 MMOL/L (ref 134–144)
T3FREE SERPL-MCNC: 3.2 PG/ML (ref 2–4.4)
T4 FREE SERPL-MCNC: 0.91 NG/DL (ref 0.82–1.77)
TRIGL SERPL-MCNC: 145 MG/DL (ref 0–149)
TSH SERPL DL<=0.005 MIU/L-ACNC: 0.83 UIU/ML (ref 0.45–4.5)

## 2023-10-06 ENCOUNTER — ESTABLISHED COMPREHENSIVE EXAM (OUTPATIENT)
Dept: URBAN - METROPOLITAN AREA CLINIC 22 | Facility: CLINIC | Age: 73
End: 2023-10-06

## 2023-10-06 DIAGNOSIS — E11.9: ICD-10-CM

## 2023-10-06 DIAGNOSIS — H25.13: ICD-10-CM

## 2023-10-06 DIAGNOSIS — H04.123: ICD-10-CM

## 2023-10-06 PROCEDURE — 92014 COMPRE OPH EXAM EST PT 1/>: CPT

## 2023-10-06 ASSESSMENT — TONOMETRY
OS_IOP_MMHG: 16
OD_IOP_MMHG: 16

## 2023-10-06 ASSESSMENT — VISUAL ACUITY
OD_CC: 20/25+2
OS_CC: 20/25-2

## 2023-10-15 ASSESSMENT — TONOMETRY
OS_IOP_MMHG: 18
OD_IOP_MMHG: 15
OD_IOP_MMHG: 18
OD_IOP_MMHG: 15
OS_IOP_MMHG: 17
OS_IOP_MMHG: 18
OS_IOP_MMHG: 15
OD_IOP_MMHG: 15
OD_IOP_MMHG: 17
OS_IOP_MMHG: 14

## 2023-10-15 ASSESSMENT — VISUAL ACUITY
OD_CC: 20/20
OD_CC: 20/20
OS_CC: 20/20-1
OS_CC: 20/20
OS_CC: 20/20-1
OS_CC: 20/25-2
OS_CC: 20/20
OD_CC: 20/20-1
OD_CC: 20/30
OD_CC: 20/20-2

## 2024-10-11 ENCOUNTER — ESTABLISHED COMPREHENSIVE EXAM (OUTPATIENT)
Dept: URBAN - METROPOLITAN AREA CLINIC 22 | Facility: CLINIC | Age: 74
End: 2024-10-11

## 2024-10-11 DIAGNOSIS — E11.9: ICD-10-CM

## 2024-10-11 DIAGNOSIS — H25.13: ICD-10-CM

## 2024-10-11 DIAGNOSIS — H04.123: ICD-10-CM

## 2024-10-11 PROCEDURE — 92014 COMPRE OPH EXAM EST PT 1/>: CPT

## 2024-10-11 ASSESSMENT — TONOMETRY
OD_IOP_MMHG: 13
OS_IOP_MMHG: 13

## 2024-10-11 ASSESSMENT — VISUAL ACUITY
OD_CC: 20/20-2
OS_GLARE: 20/30
OD_GLARE: 20/30
OS_CC: 20/20